# Patient Record
Sex: MALE | Race: WHITE | NOT HISPANIC OR LATINO | Employment: FULL TIME | URBAN - METROPOLITAN AREA
[De-identification: names, ages, dates, MRNs, and addresses within clinical notes are randomized per-mention and may not be internally consistent; named-entity substitution may affect disease eponyms.]

---

## 2023-02-18 ENCOUNTER — APPOINTMENT (OUTPATIENT)
Dept: GENERAL RADIOLOGY | Facility: HOSPITAL | Age: 58
End: 2023-02-18
Payer: MEDICAID

## 2023-02-18 ENCOUNTER — HOSPITAL ENCOUNTER (EMERGENCY)
Facility: HOSPITAL | Age: 58
Discharge: HOME | End: 2023-02-18
Attending: EMERGENCY MEDICINE | Admitting: EMERGENCY MEDICINE
Payer: MEDICAID

## 2023-02-18 VITALS
TEMPERATURE: 98.9 F | HEART RATE: 75 BPM | OXYGEN SATURATION: 99 % | RESPIRATION RATE: 16 BRPM | HEIGHT: 70 IN | BODY MASS INDEX: 21.47 KG/M2 | WEIGHT: 150 LBS | DIASTOLIC BLOOD PRESSURE: 100 MMHG | SYSTOLIC BLOOD PRESSURE: 169 MMHG

## 2023-02-18 DIAGNOSIS — L03.031 CELLULITIS OF TOE OF RIGHT FOOT: ICD-10-CM

## 2023-02-18 DIAGNOSIS — M79.89 FOOT SWELLING: Primary | ICD-10-CM

## 2023-02-18 DIAGNOSIS — R60.0 EDEMA OF BOTH LOWER EXTREMITIES: ICD-10-CM

## 2023-02-18 DIAGNOSIS — B35.1 FUNGAL INFECTION OF NAIL: ICD-10-CM

## 2023-02-18 LAB
ALBUMIN SERPL-MCNC: 4.5 G/DL (ref 3.5–5.2)
ALBUMIN/GLOB SERPL: 1.8 G/DL
ALP SERPL-CCNC: 95 U/L (ref 39–117)
ALT SERPL W P-5'-P-CCNC: 21 U/L (ref 1–41)
ANION GAP SERPL CALCULATED.3IONS-SCNC: 9.8 MMOL/L (ref 5–15)
AST SERPL-CCNC: 18 U/L (ref 1–40)
BASOPHILS # BLD AUTO: 0.1 10*3/MM3 (ref 0–0.2)
BASOPHILS NFR BLD AUTO: 0.8 % (ref 0–1.5)
BILIRUB SERPL-MCNC: 0.3 MG/DL (ref 0–1.2)
BUN SERPL-MCNC: 13 MG/DL (ref 6–20)
BUN/CREAT SERPL: 15.9 (ref 7–25)
CALCIUM SPEC-SCNC: 9.5 MG/DL (ref 8.6–10.5)
CHLORIDE SERPL-SCNC: 104 MMOL/L (ref 98–107)
CO2 SERPL-SCNC: 27.2 MMOL/L (ref 22–29)
CREAT SERPL-MCNC: 0.82 MG/DL (ref 0.76–1.27)
CRP SERPL-MCNC: <0.3 MG/DL (ref 0–0.5)
DEPRECATED RDW RBC AUTO: 47.5 FL (ref 37–54)
EGFRCR SERPLBLD CKD-EPI 2021: 101.8 ML/MIN/1.73
EOSINOPHIL # BLD AUTO: 0.53 10*3/MM3 (ref 0–0.4)
EOSINOPHIL NFR BLD AUTO: 4 % (ref 0.3–6.2)
ERYTHROCYTE [DISTWIDTH] IN BLOOD BY AUTOMATED COUNT: 13.8 % (ref 12.3–15.4)
ERYTHROCYTE [SEDIMENTATION RATE] IN BLOOD: 11 MM/HR (ref 0–20)
GLOBULIN UR ELPH-MCNC: 2.5 GM/DL
GLUCOSE SERPL-MCNC: 96 MG/DL (ref 65–99)
HCT VFR BLD AUTO: 41.2 % (ref 37.5–51)
HGB BLD-MCNC: 14 G/DL (ref 13–17.7)
HOLD SPECIMEN: NORMAL
HOLD SPECIMEN: NORMAL
IMM GRANULOCYTES # BLD AUTO: 0.06 10*3/MM3 (ref 0–0.05)
IMM GRANULOCYTES NFR BLD AUTO: 0.5 % (ref 0–0.5)
LYMPHOCYTES # BLD AUTO: 3.71 10*3/MM3 (ref 0.7–3.1)
LYMPHOCYTES NFR BLD AUTO: 27.8 % (ref 19.6–45.3)
MCH RBC QN AUTO: 32 PG (ref 26.6–33)
MCHC RBC AUTO-ENTMCNC: 34 G/DL (ref 31.5–35.7)
MCV RBC AUTO: 94.1 FL (ref 79–97)
MONOCYTES # BLD AUTO: 0.8 10*3/MM3 (ref 0.1–0.9)
MONOCYTES NFR BLD AUTO: 6 % (ref 5–12)
NEUTROPHILS NFR BLD AUTO: 60.9 % (ref 42.7–76)
NEUTROPHILS NFR BLD AUTO: 8.13 10*3/MM3 (ref 1.7–7)
NRBC BLD AUTO-RTO: 0 /100 WBC (ref 0–0.2)
NT-PROBNP SERPL-MCNC: 80.9 PG/ML (ref 0–900)
PLATELET # BLD AUTO: 348 10*3/MM3 (ref 140–450)
PMV BLD AUTO: 9.3 FL (ref 6–12)
POTASSIUM SERPL-SCNC: 4 MMOL/L (ref 3.5–5.2)
PROT SERPL-MCNC: 7 G/DL (ref 6–8.5)
RBC # BLD AUTO: 4.38 10*6/MM3 (ref 4.14–5.8)
SODIUM SERPL-SCNC: 141 MMOL/L (ref 136–145)
WBC NRBC COR # BLD: 13.33 10*3/MM3 (ref 3.4–10.8)
WHOLE BLOOD HOLD COAG: NORMAL
WHOLE BLOOD HOLD SPECIMEN: NORMAL

## 2023-02-18 PROCEDURE — 85652 RBC SED RATE AUTOMATED: CPT | Performed by: EMERGENCY MEDICINE

## 2023-02-18 PROCEDURE — 63710000001 ONDANSETRON ODT 4 MG TABLET DISPERSIBLE: Performed by: NURSE PRACTITIONER

## 2023-02-18 PROCEDURE — 83880 ASSAY OF NATRIURETIC PEPTIDE: CPT | Performed by: NURSE PRACTITIONER

## 2023-02-18 PROCEDURE — 80053 COMPREHEN METABOLIC PANEL: CPT | Performed by: EMERGENCY MEDICINE

## 2023-02-18 PROCEDURE — 86140 C-REACTIVE PROTEIN: CPT | Performed by: EMERGENCY MEDICINE

## 2023-02-18 PROCEDURE — 85025 COMPLETE CBC W/AUTO DIFF WBC: CPT | Performed by: EMERGENCY MEDICINE

## 2023-02-18 PROCEDURE — 99283 EMERGENCY DEPT VISIT LOW MDM: CPT

## 2023-02-18 PROCEDURE — 73630 X-RAY EXAM OF FOOT: CPT

## 2023-02-18 RX ORDER — HYDROCODONE BITARTRATE AND ACETAMINOPHEN 5; 325 MG/1; MG/1
1 TABLET ORAL ONCE
Status: COMPLETED | OUTPATIENT
Start: 2023-02-18 | End: 2023-02-18

## 2023-02-18 RX ORDER — ONDANSETRON 4 MG/1
4 TABLET, ORALLY DISINTEGRATING ORAL ONCE
Status: COMPLETED | OUTPATIENT
Start: 2023-02-18 | End: 2023-02-18

## 2023-02-18 RX ORDER — SODIUM CHLORIDE 0.9 % (FLUSH) 0.9 %
10 SYRINGE (ML) INJECTION AS NEEDED
Status: DISCONTINUED | OUTPATIENT
Start: 2023-02-18 | End: 2023-02-18 | Stop reason: HOSPADM

## 2023-02-18 RX ORDER — AMOXICILLIN AND CLAVULANATE POTASSIUM 875; 125 MG/1; MG/1
1 TABLET, FILM COATED ORAL EVERY 12 HOURS
Qty: 14 TABLET | Refills: 0 | Status: SHIPPED | OUTPATIENT
Start: 2023-02-18 | End: 2023-03-13

## 2023-02-18 RX ADMIN — ONDANSETRON 4 MG: 4 TABLET, ORALLY DISINTEGRATING ORAL at 08:44

## 2023-02-18 RX ADMIN — HYDROCODONE BITARTRATE AND ACETAMINOPHEN 1 TABLET: 5; 325 TABLET ORAL at 08:45

## 2023-02-18 NOTE — DISCHARGE INSTRUCTIONS
All of your lab work today was overall normal.  Your white blood cell count was mildly elevated.  The x-ray of your foot does not show any infection into the bone.  I am sending you home with oral antibiotics as well as fungal infection medication for you to apply topically.  Please make sure you take and complete the course of therapy of the antibiotics until they are gone.  Additionally, please call and schedule a follow-up appointment with podiatry.  Keep your legs elevated is much as possible to help with the swelling.  Please find a family doctor to follow-up with for regular health maintenance.  Please keep a monitor on your blood pressure as it was elevated today.  Please return to the emergency department with any new or worsening symptoms.

## 2023-02-18 NOTE — ED PROVIDER NOTES
Time: 7:12 AM EST  Date of encounter:  2/18/2023  Independent Historian/Clinical History and Information was obtained by:   Patient  Chief Complaint: Bilateral foot pain    History is limited by: N/A    History of Present Illness:  Patient is a 57 y.o. year old male who presents to the emergency department for evaluation of bilateral foot pain, swelling and right great toe injury.  Patient reports for the last couple of days he has noticed increased swelling to his feet bilaterally.  Patient reports in December, he noticed that after stubbing his right great toe and wearing his boots outside in really cold wet weather, he has developed this pain to his right great toe as well as scabbing and some redness.  He reports that he has been trying to take care of it is self at home.  He states he never goes to the doctor.  He has no PCP at this time.  Denies any significant past medical history.  He is a smoker.  He denies any chest pain or shortness of breath.  Denies any fever.  States that he has been occasionally using peroxide to help clean his toe, but has not used it for a month.  Pain is worse with walking.  He complains of a stinging pain.  Has good sensation.    HPI    Patient Care Team  Primary Care Provider: Provider, No Known    Past Medical History:     No Known Allergies  History reviewed. No pertinent past medical history.  History reviewed. No pertinent surgical history.  History reviewed. No pertinent family history.    Home Medications:  Prior to Admission medications    Not on File        Social History:   Social History     Tobacco Use   • Smoking status: Every Day     Packs/day: 1.00     Types: Cigarettes   Substance Use Topics   • Alcohol use: Never   • Drug use: Yes     Types: Marijuana         Review of Systems:  Review of Systems   Constitutional: Negative for activity change, chills, fatigue and fever.   HENT: Negative.    Eyes: Negative.    Respiratory: Negative for shortness of breath.   "  Cardiovascular: Negative for chest pain.   Gastrointestinal: Negative for nausea and vomiting.   Endocrine: Negative.    Genitourinary: Negative.    Musculoskeletal: Positive for arthralgias (Bilateral feet). Negative for joint swelling, neck pain and neck stiffness.   Skin: Positive for color change (Right great toe) and wound (Right great toe).   Allergic/Immunologic: Negative.    Neurological: Negative for dizziness and headaches.   Hematological: Negative.    Psychiatric/Behavioral: Negative.         Physical Exam:  /100   Pulse 75   Temp 98.9 °F (37.2 °C) (Oral)   Resp 16   Ht 177.8 cm (70\")   Wt 68 kg (150 lb)   SpO2 99%   BMI 21.52 kg/m²     Physical Exam  Vitals and nursing note reviewed.   Constitutional:       Appearance: Normal appearance. He is not ill-appearing or toxic-appearing.   HENT:      Head: Normocephalic and atraumatic.      Mouth/Throat:      Mouth: Mucous membranes are moist.      Pharynx: Oropharynx is clear.   Eyes:      Extraocular Movements: Extraocular movements intact.      Pupils: Pupils are equal, round, and reactive to light.   Cardiovascular:      Rate and Rhythm: Normal rate and regular rhythm.      Pulses: Normal pulses.      Heart sounds: Normal heart sounds.      Comments: Patient has 3-4+ pitting edema to his feet bilaterally.  Pulses are palpable and equal bilaterally.  Pulmonary:      Effort: Pulmonary effort is normal.      Breath sounds: Normal breath sounds.   Abdominal:      General: Bowel sounds are normal.      Palpations: Abdomen is soft.      Tenderness: There is no abdominal tenderness.   Musculoskeletal:         General: Swelling (Right great toe) and tenderness (Right great toe) present.      Cervical back: Normal range of motion and neck supple.      Comments: Patient has 3-4+ pitting edema to his feet bilaterally.  Extends to just past his ankles bilaterally.   Skin:     General: Skin is warm and dry.      Capillary Refill: Capillary refill takes " less than 2 seconds.      Coloration: Skin is not pale.      Findings: Erythema present. No rash.      Comments: The right great toe surrounding the entire nail and the end of the toe is covered in a scabbing, brownish coating.  There is no drainage.    There is skin peeling and dryness noted to his feet bilaterally, more prominent to the soles of his feet.    There is some mild erythema noted to his feet bilaterally.  More notably to his right great toe and surrounding skin.   Neurological:      General: No focal deficit present.      Mental Status: He is alert and oriented to person, place, and time.   Psychiatric:         Mood and Affect: Mood normal.         Behavior: Behavior normal.                  Procedures:  Procedures      Medical Decision Making:      Comorbidities that affect care:    Smoking    External Notes reviewed:    None      The following orders were placed and all results were independently analyzed by me:  Orders Placed This Encounter   Procedures   • XR Foot 3+ View Right   • Woodruff Draw   • Comprehensive Metabolic Panel   • C-reactive Protein   • Sedimentation Rate   • CBC Auto Differential   • BNP   • Ambulatory Referral to Podiatry   • Insert peripheral IV   • Green Top (Gel)   • Lavender Top   • Gold Top - SST   • Light Blue Top   • CBC & Differential       Medications Given in the Emergency Department:  Medications   sodium chloride 0.9 % flush 10 mL (has no administration in time range)   HYDROcodone-acetaminophen (NORCO) 5-325 MG per tablet 1 tablet (1 tablet Oral Given 2/18/23 0845)   ondansetron ODT (ZOFRAN-ODT) disintegrating tablet 4 mg (4 mg Oral Given 2/18/23 0844)        ED Course:    ED Course as of 02/18/23 0905   Sat Feb 18, 2023   0805 CBC reveals mild leukocytosis.  White blood cell count is 13.33.  Otherwise normal.  CMP is unremarkable.  CRP is normal.  Sed rate is normal. [AR]   0806 X-ray of the right foot reveals the following  1. Diffuse soft tissue swelling may  represent dependent edema, posttraumatic changes or cellulitis.  2. Diffuse osteopenia  3. No radiographic evidence of osteomyelitis. [AR]   0838 BNP normal [AR]      ED Course User Index  [AR] Eda De La O, NADJA       Labs:    Lab Results (last 24 hours)     Procedure Component Value Units Date/Time    CBC & Differential [[id number]]  (Abnormal) Collected: 02/18/23 0637    Specimen: Blood Updated: 02/18/23 0702    Narrative:      The following orders were created for panel order CBC & Differential.  Procedure                               Abnormality         Status                     ---------                               -----------         ------                     CBC Auto Differential[[id number]]        Abnormal            Final result                 Please view results for these tests on the individual orders.    Comprehensive Metabolic Panel [[id number]] Collected: 02/18/23 0637    Specimen: Blood Updated: 02/18/23 0709     Glucose 96 mg/dL      BUN 13 mg/dL      Creatinine 0.82 mg/dL      Sodium 141 mmol/L      Potassium 4.0 mmol/L      Chloride 104 mmol/L      CO2 27.2 mmol/L      Calcium 9.5 mg/dL      Total Protein 7.0 g/dL      Albumin 4.5 g/dL      ALT (SGPT) 21 U/L      AST (SGOT) 18 U/L      Alkaline Phosphatase 95 U/L      Total Bilirubin 0.3 mg/dL      Globulin 2.5 gm/dL      A/G Ratio 1.8 g/dL      BUN/Creatinine Ratio 15.9     Anion Gap 9.8 mmol/L      eGFR 101.8 mL/min/1.73     Narrative:      GFR Normal >60  Chronic Kidney Disease <60  Kidney Failure <15      C-reactive Protein [[id number]]  (Normal) Collected: 02/18/23 0637    Specimen: Blood Updated: 02/18/23 0709     C-Reactive Protein <0.30 mg/dL     Sedimentation Rate [[id number]]  (Normal) Collected: 02/18/23 0637    Specimen: Blood Updated: 02/18/23 0655     Sed Rate 11 mm/hr     CBC Auto Differential [[id number]]  (Abnormal) Collected: 02/18/23 0637    Specimen: Blood Updated: 02/18/23 0702     WBC 13.33 10*3/mm3      RBC 4.38  10*6/mm3      Hemoglobin 14.0 g/dL      Hematocrit 41.2 %      MCV 94.1 fL      MCH 32.0 pg      MCHC 34.0 g/dL      RDW 13.8 %      RDW-SD 47.5 fl      MPV 9.3 fL      Platelets 348 10*3/mm3      Neutrophil % 60.9 %      Lymphocyte % 27.8 %      Monocyte % 6.0 %      Eosinophil % 4.0 %      Basophil % 0.8 %      Immature Grans % 0.5 %      Neutrophils, Absolute 8.13 10*3/mm3      Lymphocytes, Absolute 3.71 10*3/mm3      Monocytes, Absolute 0.80 10*3/mm3      Eosinophils, Absolute 0.53 10*3/mm3      Basophils, Absolute 0.10 10*3/mm3      Immature Grans, Absolute 0.06 10*3/mm3      nRBC 0.0 /100 WBC     BNP [[id number]]  (Normal) Collected: 02/18/23 0637    Specimen: Blood Updated: 02/18/23 0817     proBNP 80.9 pg/mL     Narrative:      Among patients with dyspnea, NT-proBNP is highly sensitive for the detection of acute congestive heart failure. In addition NT-proBNP of <300 pg/ml effectively rules out acute congestive heart failure with 99% negative predictive value.    Results may be falsely decreased if patient taking Biotin.             Imaging:    XR Foot 3+ View Right    Result Date: 2/18/2023  PROCEDURE: XR FOOT 3+ VW RIGHT  COMPARISON: None  INDICATIONS: non healing wound/swelling rt foot  FINDINGS:  Moderate soft tissue swelling is noted throughout the foot.  No soft tissue gas or radiopaque foreign body is identified.  The bones appear diffusely osteopenic.  No osseous destruction is identified to suggest osteomyelitis.  No tibiotalar or subtalar joint effusion is noted.        1. Diffuse soft tissue swelling may represent dependent edema, posttraumatic changes or cellulitis. 2. Diffuse osteopenia 3. No radiographic evidence of osteomyelitis.      Priyank Lopez M.D.       Electronically Signed and Approved By: Priyank Lopez M.D. on 2/18/2023 at 7:05                 Differential Diagnosis and Discussion:    Extremity Pain: Differential diagnosis includes but is not limited to soft tissue sprain, tendonitis,  tendon injury, dislocation, fracture, deep vein thrombosis, arterial insufficiency, osteoarthritis, bursitis, and ligamentous damage.    All labs were reviewed and interpreted by me.  All X-rays were independently reviewed by me.    MDM  Number of Diagnoses or Management Options  Cellulitis of toe of right foot  Edema of both lower extremities  Foot swelling  Fungal infection of nail  Diagnosis management comments: I have explained the patient´s condition, diagnoses and treatment plan based on the information available to me at this time. I have answered questions and addressed any concerns. The patient has a good  understanding of the patient´s diagnosis, condition, and treatment plan as can be expected at this point. The vital signs have been stable. The patient´s condition is stable and appropriate for discharge from the emergency department.      The patient will pursue further outpatient evaluation with the primary care physician or other designated or consulting physician as outlined in the discharge instructions. They are agreeable to this plan of care and follow-up instructions have been explained in detail. The patient has received these instructions in written format and have expressed an understanding of the discharge instructions. The patient is aware that any significant change in condition or worsening of symptoms should prompt an immediate return to this or the closest emergency department or call to 911.       Amount and/or Complexity of Data Reviewed  Clinical lab tests: reviewed and ordered  Tests in the radiology section of CPT®: reviewed and ordered  Tests in the medicine section of CPT®: reviewed and ordered  Review and summarize past medical records: yes  Independent visualization of images, tracings, or specimens: yes    Risk of Complications, Morbidity, and/or Mortality  Presenting problems: moderate  Diagnostic procedures: moderate  Management options: moderate    Patient Progress  Patient  progress: stable            Patient Care Considerations:    CT EXTREMITY: I considered ordering an extremity CT, however X-ray is negative for any evidence of osteomyelitis or presence of gas.  Patient is neurovascularly intact.      Consultants/Shared Management Plan:    None    Social Determinants of Health:    Patient is independent, reliable, and has access to care.       Disposition and Care Coordination:    Discharged: The patient is suitable and stable for discharge with no need for consideration of observation or admission.    I have explained discharge medications and the need for follow up with the patient/caretakers. This was also printed in the discharge instructions. Patient was discharged with the following medications and follow up:      Medication List      New Prescriptions    amoxicillin-clavulanate 875-125 MG per tablet  Commonly known as: AUGMENTIN  Take 1 tablet by mouth Every 12 (Twelve) Hours.     terbinafine 1 % cream  Commonly known as: lamISIL  Apply 1 application topically to the appropriate area as directed Every Night.           Where to Get Your Medications      These medications were sent to [facility] [address] [place] [address] [phone] [place] [id number] [address] [address]    Phone: [phone]   · amoxicillin-clavulanate 875-125 MG per tablet  · terbinafine 1 % cream      Austin Tamez, FOUZIA  [address] [facility]  [address]  [facility] [address]  [phone]    Schedule an appointment as soon as possible for a visit       [facility] EMERGENCY ROOM  [address] [place] [id number] [phone]  Go to   If symptoms worsen, As needed    Provider, No Known  [facility] [place] [address]      Please find a family doctor to follow-up with       Final diagnoses:   Foot swelling   Edema of both lower extremities   Cellulitis of toe of right foot   Fungal infection of nail        ED  Disposition     ED Disposition   Discharge    Condition   Stable    Comment   --             This medical record created using voice recognition software.           Eda De La O, APRN  02/18/23 0911

## 2023-02-24 ENCOUNTER — OFFICE VISIT (OUTPATIENT)
Dept: PODIATRY | Facility: CLINIC | Age: 58
End: 2023-02-24
Payer: MEDICAID

## 2023-02-24 VITALS
HEART RATE: 95 BPM | HEIGHT: 70 IN | TEMPERATURE: 98.4 F | WEIGHT: 150 LBS | BODY MASS INDEX: 21.47 KG/M2 | OXYGEN SATURATION: 97 % | SYSTOLIC BLOOD PRESSURE: 178 MMHG | DIASTOLIC BLOOD PRESSURE: 99 MMHG

## 2023-02-24 DIAGNOSIS — L02.611 ABSCESS, TOE, RIGHT: Primary | ICD-10-CM

## 2023-02-24 DIAGNOSIS — I73.9 PERIPHERAL VASCULAR DISEASE: ICD-10-CM

## 2023-02-24 PROCEDURE — 87070 CULTURE OTHR SPECIMN AEROBIC: CPT | Performed by: PODIATRIST

## 2023-02-24 PROCEDURE — 87205 SMEAR GRAM STAIN: CPT | Performed by: PODIATRIST

## 2023-02-24 PROCEDURE — 10061 I&D ABSCESS COMP/MULTIPLE: CPT | Performed by: PODIATRIST

## 2023-02-24 PROCEDURE — 99203 OFFICE O/P NEW LOW 30 MIN: CPT | Performed by: PODIATRIST

## 2023-02-24 RX ORDER — SULFAMETHOXAZOLE AND TRIMETHOPRIM 800; 160 MG/1; MG/1
1 TABLET ORAL 2 TIMES DAILY
Qty: 20 TABLET | Refills: 0 | Status: SHIPPED | OUTPATIENT
Start: 2023-02-24 | End: 2023-03-10

## 2023-02-24 NOTE — PROGRESS NOTES
[facility] - PODIATRY    Today's Date: 02/24/23    Patient Name: Marco Antonio Davison  MRN: [id number]  CSN: [id number]  PCP: Provider, No Known,   Referring Provider: Eda De La O APRN    SUBJECTIVE     Chief Complaint   Patient presents with   • Left Foot - Establish Care, Cellulitis, Pain   • Right Foot - Establish Care, Cellulitis, Pain     HPI: Marco Antonio Davison, a 57 y.o.male, presents to clinic.    Patient is a 58-year-old male presenting with right big toe infection.  Patient states that he hit his toe on something several months ago.  It is slowly gotten swollen along with more tenderness.  Patient states it has been draining in the past.  He states he smokes over 1 pack a day.  He does not have a primary care doctor and has not seen a physician recently.  He has pain in his foot.    Past Medical History:   Diagnosis Date   • Callus N/a   • Difficulty walking N/a     History reviewed. No pertinent surgical history.  Family History   Problem Relation Age of Onset   • Cancer Neg Hx    • Diabetes Neg Hx    • Heart disease Neg Hx    • Hypertension Neg Hx    • Thyroid disease Neg Hx      Social History     Socioeconomic History   • Marital status:    Tobacco Use   • Smoking status: Every Day     Packs/day: 1.00     Years: 40.00     Pack years: 40.00     Types: Cigarettes   Vaping Use   • Vaping Use: Never used   Substance and Sexual Activity   • Alcohol use: Never   • Drug use: Yes     Frequency: 2.0 times per week     Types: Marijuana   • Sexual activity: Not Currently     No Known Allergies  Current Outpatient Medications   Medication Sig Dispense Refill   • amoxicillin-clavulanate (AUGMENTIN) 875-125 MG per tablet Take 1 tablet by mouth Every 12 (Twelve) Hours. 14 tablet 0   • terbinafine (lamISIL) 1 % cream Apply 1 application topically to the appropriate area as directed Every Night. 15 g 0   • sulfamethoxazole-trimethoprim (Bactrim DS) 800-160 MG per tablet Take 1 tablet by mouth 2 (Two)  Times a Day. 20 tablet 0     No current facility-administered medications for this visit.     Review of Systems   Skin:        Infection right great toe       OBJECTIVE     Vitals:    02/24/23 0756   BP: 178/99   Pulse: 95   Temp: 98.4 °F (36.9 °C)   SpO2: 97%       WBC   Date Value Ref Range Status   02/18/2023 13.33 (H) 3.40 - 10.80 10*3/mm3 Final     RBC   Date Value Ref Range Status   02/18/2023 4.38 4.14 - 5.80 10*6/mm3 Final     Hemoglobin   Date Value Ref Range Status   02/18/2023 14.0 13.0 - 17.7 g/dL Final     Hematocrit   Date Value Ref Range Status   02/18/2023 41.2 37.5 - 51.0 % Final     MCV   Date Value Ref Range Status   02/18/2023 94.1 79.0 - 97.0 fL Final     MCH   Date Value Ref Range Status   02/18/2023 32.0 26.6 - 33.0 pg Final     MCHC   Date Value Ref Range Status   02/18/2023 34.0 31.5 - 35.7 g/dL Final     RDW   Date Value Ref Range Status   02/18/2023 13.8 12.3 - 15.4 % Final     RDW-SD   Date Value Ref Range Status   02/18/2023 47.5 37.0 - 54.0 fl Final     MPV   Date Value Ref Range Status   02/18/2023 9.3 6.0 - 12.0 fL Final     Platelets   Date Value Ref Range Status   02/18/2023 348 140 - 450 10*3/mm3 Final     Neutrophil %   Date Value Ref Range Status   02/18/2023 60.9 42.7 - 76.0 % Final     Lymphocyte %   Date Value Ref Range Status   02/18/2023 27.8 19.6 - 45.3 % Final     Monocyte %   Date Value Ref Range Status   02/18/2023 6.0 5.0 - 12.0 % Final     Eosinophil %   Date Value Ref Range Status   02/18/2023 4.0 0.3 - 6.2 % Final     Basophil %   Date Value Ref Range Status   02/18/2023 0.8 0.0 - 1.5 % Final     Immature Grans %   Date Value Ref Range Status   02/18/2023 0.5 0.0 - 0.5 % Final     Neutrophils, Absolute   Date Value Ref Range Status   02/18/2023 8.13 (H) 1.70 - 7.00 10*3/mm3 Final     Lymphocytes, Absolute   Date Value Ref Range Status   02/18/2023 3.71 (H) 0.70 - 3.10 10*3/mm3 Final     Monocytes, Absolute   Date Value Ref Range Status   02/18/2023 0.80 0.10 - 0.90  10*3/mm3 Final     Eosinophils, Absolute   Date Value Ref Range Status   02/18/2023 0.53 (H) 0.00 - 0.40 10*3/mm3 Final     Basophils, Absolute   Date Value Ref Range Status   02/18/2023 0.10 0.00 - 0.20 10*3/mm3 Final     Immature Grans, Absolute   Date Value Ref Range Status   02/18/2023 0.06 (H) 0.00 - 0.05 10*3/mm3 Final     nRBC   Date Value Ref Range Status   02/18/2023 0.0 0.0 - 0.2 /100 WBC Final         Lab Results   Component Value Date    GLUCOSE 96 02/18/2023    BUN 13 02/18/2023    CREATININE 0.82 02/18/2023    BCR 15.9 02/18/2023    K 4.0 02/18/2023    CO2 27.2 02/18/2023    CALCIUM 9.5 02/18/2023    ALBUMIN 4.5 02/18/2023    AST 18 02/18/2023    ALT 21 02/18/2023       Patient seen in no apparent distress.      PHYSICAL EXAM:     Foot/Ankle Exam:       General:   Appearance: appears stated age and healthy    Orientation: AAOx3    Affect: appropriate    Gait: unimpaired    Shoe Gear:  Casual shoes    VASCULAR      Right Foot Vascularity   Normal vascular exam    Dorsalis pedis:  1+  Posterior tibial:  1+  Skin Temperature: warm    Edema Grading:  None, pitting and 3+  CFT:  < 3 seconds  Pedal Hair Growth:  Present  Varicosities: none       Left Foot Vascularity   Normal vascular exam    Dorsalis pedis:  1+  Posterior tibial:  1+  Skin Temperature: warm    Edema Grading:  None, 3+ and pitting  CFT:  < 3 seconds  Pedal Hair Growth:  Present  Varicosities: none        NEUROLOGIC     Right Foot Neurologic   Normal sensation    Light touch sensation:  Normal  Vibratory sensation:  Normal  Hot/Cold sensation: normal    Protective Sensation using [place] Monofilament:  10     Left Foot Neurologic   Normal sensation    Light touch sensation:  Normal  Vibratory sensation:  Normal  Hot/cold sensation: normal    Protective Sensation using [place] Monofilament:  10     MUSCLE STRENGTH     Right Foot Muscle Strength   Foot dorsiflexion:  4  Foot plantar flexion:  4  Foot inversion:  4  Foot  eversion:  4     Left Foot Muscle Strength   Foot dorsiflexion:  4  Foot plantar flexion:  4  Foot inversion:  4  Foot eversion:  4     RANGE OF MOTION      Right Foot Range of Motion   Foot and ankle ROM within normal limits       Left Foot Range of Motion   Foot and ankle ROM within normal limits       DERMATOLOGIC     Right Foot Dermatologic   Skin: skin intact    Skin comment:  Swelling and erythema to right great toe with purulent drainage from right great toe distal tip.  Wound to distal tip of toe 50% necrotic.  Nails: normal       Left Foot Dermatologic   Skin: skin intact    Nails: normal        XR Foot 3+ View Right    Result Date: 2/18/2023  Narrative: PROCEDURE: XR FOOT 3+ VW RIGHT  COMPARISON: None  INDICATIONS: non healing wound/swelling rt foot  FINDINGS:  Moderate soft tissue swelling is noted throughout the foot.  No soft tissue gas or radiopaque foreign body is identified.  The bones appear diffusely osteopenic.  No osseous destruction is identified to suggest osteomyelitis.  No tibiotalar or subtalar joint effusion is noted.      Impression:   1. Diffuse soft tissue swelling may represent dependent edema, posttraumatic changes or cellulitis. 2. Diffuse osteopenia 3. No radiographic evidence of osteomyelitis.      Priyank Lopez M.D.       Electronically Signed and Approved By: Priyank Lopez M.D. on 2/18/2023 at 7:05               ASSESSMENT/PLAN     Diagnoses and all orders for this visit:    1. Abscess, toe, right (Primary)  -     Doppler Arterial Multi Level Lower Extremity - Bilateral CAR; Future    2. Peripheral vascular disease (HCC)    Other orders  -     sulfamethoxazole-trimethoprim (Bactrim DS) 800-160 MG per tablet; Take 1 tablet by mouth 2 (Two) Times a Day.  Dispense: 20 tablet; Refill: 0      Right foot anesthetized using 0.5% Marcaine plain.  Site was prepped with Betadine.  Using a tissue nipper and hemostat the area was incised and drained of purulent drainage approximately 1 to 2  cc.  Cultures were taken.  Noted to have a necrotic wound to the distal tip of the great toe.  Site was flushed and dressed with Betadine Adaptic 4 x 4's.    We will order arterial studies to rule out any vascular issues.  Cultures taken  Antibiotics prescribed    Comprehensive lower extremity examination and evaluation was performed.    Discussed findings and treatment plan including risks, benefits, and treatment options with patient in detail. Patient agreed with treatment plan.    Medications and allergies reviewed.  Reviewed available lab values along with other pertinent labs.  These were discussed with the patient.    An After Visit Summary was printed and given to the patient at discharge, including (if requested) any available informative/educational handouts regarding diagnosis, treatment, or medications. All questions were answered to patient/family satisfaction. Should symptoms fail to improve or worsen they agree to call or return to clinic or to go to the Emergency Department. Discussed the importance of following up with any needed screening tests/labs/specialist appointments and any requested follow-up recommended by me today. Importance of maintaining follow-up discussed and patient accepts that missed appointments can delay diagnosis and potentially lead to worsening of conditions.    Return in about 2 weeks (around 3/10/2023)., or sooner if acute issues arise.    This document has been electronically signed by Sebastián Hernandez DPM on February 24, 2023 08:48 EST

## 2023-02-27 LAB
BACTERIA SPEC AEROBE CULT: NORMAL
GRAM STN SPEC: NORMAL
GRAM STN SPEC: NORMAL

## 2023-03-10 ENCOUNTER — OFFICE VISIT (OUTPATIENT)
Dept: PODIATRY | Facility: CLINIC | Age: 58
End: 2023-03-10
Payer: MEDICAID

## 2023-03-10 VITALS
WEIGHT: 150 LBS | BODY MASS INDEX: 21.47 KG/M2 | TEMPERATURE: 96.2 F | HEART RATE: 101 BPM | HEIGHT: 70 IN | SYSTOLIC BLOOD PRESSURE: 173 MMHG | OXYGEN SATURATION: 97 % | DIASTOLIC BLOOD PRESSURE: 90 MMHG

## 2023-03-10 DIAGNOSIS — L60.0 INGROWN TOENAIL: Primary | ICD-10-CM

## 2023-03-10 DIAGNOSIS — I96 GANGRENE: ICD-10-CM

## 2023-03-10 DIAGNOSIS — I73.9 PVD (PERIPHERAL VASCULAR DISEASE): ICD-10-CM

## 2023-03-10 PROCEDURE — 99213 OFFICE O/P EST LOW 20 MIN: CPT | Performed by: PODIATRIST

## 2023-03-10 PROCEDURE — 1159F MED LIST DOCD IN RCRD: CPT | Performed by: PODIATRIST

## 2023-03-10 PROCEDURE — 1160F RVW MEDS BY RX/DR IN RCRD: CPT | Performed by: PODIATRIST

## 2023-03-10 NOTE — PROGRESS NOTES
[facility] - PODIATRY    Today's Date: 03/10/23    Patient Name: Marco Antonio Davison  MRN: [id number]  CSN: [id number]  PCP: Provider, No Known,   Referring Provider: No ref. provider found    SUBJECTIVE     Chief Complaint   Patient presents with   • Right Foot - Follow-up, Pain, Avulsion     Patient has not followed after care instructions with soaking his foot twice a day in epsom salt soaks.      HPI: Marco Antonio Davison, a 58 y.o.male, presents to clinic.    Patient is a 58-year-old male presenting with right big toe infection.  Patient states that he hit his toe on something several months ago.  It is slowly gotten swollen along with more tenderness.  Patient states it has been draining in the past.  He states he smokes over 1 pack a day.  He does not have a primary care doctor and has not seen a physician recently.  He has pain in his foot.    3/10/2023-patient is here for follow-up.  Patient states he still has pain when elevating his legs.  Needs to put them in a dependent position to relieve pain.  Still smoking.    Past Medical History:   Diagnosis Date   • Callus N/a   • Difficulty walking N/a     History reviewed. No pertinent surgical history.  Family History   Problem Relation Age of Onset   • Heart disease Mother    • Heart disease Brother    • Cancer Neg Hx    • Diabetes Neg Hx    • Hypertension Neg Hx    • Thyroid disease Neg Hx      Social History     Socioeconomic History   • Marital status:    Tobacco Use   • Smoking status: Every Day     Packs/day: 1.00     Years: 40.00     Pack years: 40.00     Types: Cigarettes   Vaping Use   • Vaping Use: Never used   Substance and Sexual Activity   • Alcohol use: Never   • Drug use: Yes     Frequency: 2.0 times per week     Types: Marijuana   • Sexual activity: Not Currently     No Known Allergies  Current Outpatient Medications   Medication Sig Dispense Refill   • terbinafine (lamISIL) 1 % cream Apply 1 application topically to the appropriate area  as directed Every Night. 15 g 0   • amoxicillin-clavulanate (AUGMENTIN) 875-125 MG per tablet Take 1 tablet by mouth Every 12 (Twelve) Hours. 14 tablet 0     No current facility-administered medications for this visit.     Review of Systems   Skin:        Infection right great toe       OBJECTIVE     Vitals:    03/10/23 0724   BP: 173/90   Pulse: 101   Temp: 96.2 °F (35.7 °C)   SpO2: 97%       WBC   Date Value Ref Range Status   02/18/2023 13.33 (H) 3.40 - 10.80 10*3/mm3 Final     RBC   Date Value Ref Range Status   02/18/2023 4.38 4.14 - 5.80 10*6/mm3 Final     Hemoglobin   Date Value Ref Range Status   02/18/2023 14.0 13.0 - 17.7 g/dL Final     Hematocrit   Date Value Ref Range Status   02/18/2023 41.2 37.5 - 51.0 % Final     MCV   Date Value Ref Range Status   02/18/2023 94.1 79.0 - 97.0 fL Final     MCH   Date Value Ref Range Status   02/18/2023 32.0 26.6 - 33.0 pg Final     MCHC   Date Value Ref Range Status   02/18/2023 34.0 31.5 - 35.7 g/dL Final     RDW   Date Value Ref Range Status   02/18/2023 13.8 12.3 - 15.4 % Final     RDW-SD   Date Value Ref Range Status   02/18/2023 47.5 37.0 - 54.0 fl Final     MPV   Date Value Ref Range Status   02/18/2023 9.3 6.0 - 12.0 fL Final     Platelets   Date Value Ref Range Status   02/18/2023 348 140 - 450 10*3/mm3 Final     Neutrophil %   Date Value Ref Range Status   02/18/2023 60.9 42.7 - 76.0 % Final     Lymphocyte %   Date Value Ref Range Status   02/18/2023 27.8 19.6 - 45.3 % Final     Monocyte %   Date Value Ref Range Status   02/18/2023 6.0 5.0 - 12.0 % Final     Eosinophil %   Date Value Ref Range Status   02/18/2023 4.0 0.3 - 6.2 % Final     Basophil %   Date Value Ref Range Status   02/18/2023 0.8 0.0 - 1.5 % Final     Immature Grans %   Date Value Ref Range Status   02/18/2023 0.5 0.0 - 0.5 % Final     Neutrophils, Absolute   Date Value Ref Range Status   02/18/2023 8.13 (H) 1.70 - 7.00 10*3/mm3 Final     Lymphocytes, Absolute   Date Value Ref Range Status    02/18/2023 3.71 (H) 0.70 - 3.10 10*3/mm3 Final     Monocytes, Absolute   Date Value Ref Range Status   02/18/2023 0.80 0.10 - 0.90 10*3/mm3 Final     Eosinophils, Absolute   Date Value Ref Range Status   02/18/2023 0.53 (H) 0.00 - 0.40 10*3/mm3 Final     Basophils, Absolute   Date Value Ref Range Status   02/18/2023 0.10 0.00 - 0.20 10*3/mm3 Final     Immature Grans, Absolute   Date Value Ref Range Status   02/18/2023 0.06 (H) 0.00 - 0.05 10*3/mm3 Final     nRBC   Date Value Ref Range Status   02/18/2023 0.0 0.0 - 0.2 /100 WBC Final         Lab Results   Component Value Date    GLUCOSE 96 02/18/2023    BUN 13 02/18/2023    CREATININE 0.82 02/18/2023    BCR 15.9 02/18/2023    K 4.0 02/18/2023    CO2 27.2 02/18/2023    CALCIUM 9.5 02/18/2023    ALBUMIN 4.5 02/18/2023    AST 18 02/18/2023    ALT 21 02/18/2023       Patient seen in no apparent distress.      PHYSICAL EXAM:     Foot/Ankle Exam:       General:   Appearance: appears stated age and healthy    Orientation: AAOx3    Affect: appropriate    Gait: unimpaired    Shoe Gear:  Casual shoes    VASCULAR      Right Foot Vascularity   Normal vascular exam    Dorsalis pedis:  1+  Posterior tibial:  1+  Skin Temperature: warm    Edema Grading:  None, pitting and 3+  CFT:  < 3 seconds  Pedal Hair Growth:  Present  Varicosities: none       Left Foot Vascularity   Normal vascular exam    Dorsalis pedis:  1+  Posterior tibial:  1+  Skin Temperature: warm    Edema Grading:  None, 3+ and pitting  CFT:  < 3 seconds  Pedal Hair Growth:  Present  Varicosities: none        NEUROLOGIC     Right Foot Neurologic   Normal sensation    Light touch sensation:  Normal  Vibratory sensation:  Normal  Hot/Cold sensation: normal    Protective Sensation using [place] Monofilament:  10     Left Foot Neurologic   Normal sensation    Light touch sensation:  Normal  Vibratory sensation:  Normal  Hot/cold sensation: normal    Protective Sensation using [place] Monofilament:   10     MUSCLE STRENGTH     Right Foot Muscle Strength   Foot dorsiflexion:  4  Foot plantar flexion:  4  Foot inversion:  4  Foot eversion:  4     Left Foot Muscle Strength   Foot dorsiflexion:  4  Foot plantar flexion:  4  Foot inversion:  4  Foot eversion:  4     RANGE OF MOTION      Right Foot Range of Motion   Foot and ankle ROM within normal limits       Left Foot Range of Motion   Foot and ankle ROM within normal limits       DERMATOLOGIC     Right Foot Dermatologic   Skin: skin intact    Skin comment:  Erythema resolved.  No purulent drainage.  Dry necrotic eschar to distal aspect of right great toe.  Nails: normal       Left Foot Dermatologic   Skin: skin intact    Nails: normal        XR Foot 3+ View Right    Result Date: 2/18/2023  Narrative: PROCEDURE: XR FOOT 3+ VW RIGHT  COMPARISON: None  INDICATIONS: non healing wound/swelling rt foot  FINDINGS:  Moderate soft tissue swelling is noted throughout the foot.  No soft tissue gas or radiopaque foreign body is identified.  The bones appear diffusely osteopenic.  No osseous destruction is identified to suggest osteomyelitis.  No tibiotalar or subtalar joint effusion is noted.      Impression:   1. Diffuse soft tissue swelling may represent dependent edema, posttraumatic changes or cellulitis. 2. Diffuse osteopenia 3. No radiographic evidence of osteomyelitis.      Priyank Lopez M.D.       Electronically Signed and Approved By: Priyank Lopez M.D. on 2/18/2023 at 7:05               ASSESSMENT/PLAN     Diagnoses and all orders for this visit:    1. Ingrown toenail (Primary)    2. Gangrene (HCC)    3. PVD (peripheral vascular disease) (HCC)      Arterial studies scheduled in 2 weeks  Betadine daily to right great toe  Patient to call if symptoms worsen, or go to emergency department    Comprehensive lower extremity examination and evaluation was performed.    Discussed findings and treatment plan including risks, benefits, and treatment options with patient in  detail. Patient agreed with treatment plan.    Medications and allergies reviewed.  Reviewed available lab values along with other pertinent labs.  These were discussed with the patient.    An After Visit Summary was printed and given to the patient at discharge, including (if requested) any available informative/educational handouts regarding diagnosis, treatment, or medications. All questions were answered to patient/family satisfaction. Should symptoms fail to improve or worsen they agree to call or return to clinic or to go to the Emergency Department. Discussed the importance of following up with any needed screening tests/labs/specialist appointments and any requested follow-up recommended by me today. Importance of maintaining follow-up discussed and patient accepts that missed appointments can delay diagnosis and potentially lead to worsening of conditions.    Return in 2 weeks (on 3/24/2023)., or sooner if acute issues arise.    This document has been electronically signed by Sebastián Hernandez DPM on March 10, 2023 08:04 EST

## 2023-03-13 ENCOUNTER — APPOINTMENT (OUTPATIENT)
Dept: CT IMAGING | Facility: HOSPITAL | Age: 58
DRG: 301 | End: 2023-03-13
Payer: MEDICAID

## 2023-03-13 ENCOUNTER — APPOINTMENT (OUTPATIENT)
Dept: CARDIOLOGY | Facility: HOSPITAL | Age: 58
DRG: 301 | End: 2023-03-13
Payer: MEDICAID

## 2023-03-13 ENCOUNTER — HOSPITAL ENCOUNTER (INPATIENT)
Facility: HOSPITAL | Age: 58
LOS: 1 days | Discharge: ANOTHER HOSPITAL | DRG: 301 | End: 2023-03-14
Attending: EMERGENCY MEDICINE | Admitting: INTERNAL MEDICINE
Payer: MEDICAID

## 2023-03-13 DIAGNOSIS — Z78.9 DECREASED ACTIVITIES OF DAILY LIVING (ADL): ICD-10-CM

## 2023-03-13 DIAGNOSIS — R26.2 DIFFICULTY WALKING: ICD-10-CM

## 2023-03-13 DIAGNOSIS — I70.90 ARTERIAL OCCLUSION: Primary | ICD-10-CM

## 2023-03-13 LAB
ALBUMIN SERPL-MCNC: 4.5 G/DL (ref 3.5–5.2)
ALBUMIN/GLOB SERPL: 1.7 G/DL
ALP SERPL-CCNC: 108 U/L (ref 39–117)
ALT SERPL W P-5'-P-CCNC: 20 U/L (ref 1–41)
ANION GAP SERPL CALCULATED.3IONS-SCNC: 11.1 MMOL/L (ref 5–15)
APTT PPP: 133.6 SECONDS (ref 78–95.9)
AST SERPL-CCNC: 17 U/L (ref 1–40)
BASOPHILS # BLD AUTO: 0.07 10*3/MM3 (ref 0–0.2)
BASOPHILS NFR BLD AUTO: 0.5 % (ref 0–1.5)
BH CV LOWER ARTERIAL LEFT ABI RATIO: 0.43
BH CV LOWER ARTERIAL LEFT DORSALIS PEDIS SYS MAX: 86
BH CV LOWER ARTERIAL LEFT GREAT TOE SYS MAX: 25
BH CV LOWER ARTERIAL LEFT POST TIBIAL SYS MAX: 75
BH CV LOWER ARTERIAL LEFT TBI RATIO: 0.13
BH CV LOWER ARTERIAL RIGHT ABI RATIO: 0.2
BH CV LOWER ARTERIAL RIGHT DORSALIS PEDIS SYS MAX: 29
BH CV LOWER ARTERIAL RIGHT POST TIBIAL SYS MAX: 40
BH CV LOWER VASCULAR LEFT COMMON FEMORAL AUGMENT: NORMAL
BH CV LOWER VASCULAR LEFT COMMON FEMORAL COMPETENT: NORMAL
BH CV LOWER VASCULAR LEFT COMMON FEMORAL COMPRESS: NORMAL
BH CV LOWER VASCULAR LEFT COMMON FEMORAL PHASIC: NORMAL
BH CV LOWER VASCULAR LEFT COMMON FEMORAL SPONT: NORMAL
BH CV LOWER VASCULAR LEFT DISTAL FEMORAL COMPRESS: NORMAL
BH CV LOWER VASCULAR LEFT MID FEMORAL AUGMENT: NORMAL
BH CV LOWER VASCULAR LEFT MID FEMORAL COMPETENT: NORMAL
BH CV LOWER VASCULAR LEFT MID FEMORAL COMPRESS: NORMAL
BH CV LOWER VASCULAR LEFT MID FEMORAL PHASIC: NORMAL
BH CV LOWER VASCULAR LEFT MID FEMORAL SPONT: NORMAL
BH CV LOWER VASCULAR LEFT PERONEAL COMPRESS: NORMAL
BH CV LOWER VASCULAR LEFT POPLITEAL AUGMENT: NORMAL
BH CV LOWER VASCULAR LEFT POPLITEAL COMPETENT: NORMAL
BH CV LOWER VASCULAR LEFT POPLITEAL COMPRESS: NORMAL
BH CV LOWER VASCULAR LEFT POPLITEAL PHASIC: NORMAL
BH CV LOWER VASCULAR LEFT POPLITEAL SPONT: NORMAL
BH CV LOWER VASCULAR LEFT POSTERIOR TIBIAL COMPRESS: NORMAL
BH CV LOWER VASCULAR LEFT PROXIMAL FEMORAL COMPRESS: NORMAL
BH CV LOWER VASCULAR LEFT SAPHENOFEMORAL JUNCTION COMPRESS: NORMAL
BH CV LOWER VASCULAR RIGHT COMMON FEMORAL AUGMENT: NORMAL
BH CV LOWER VASCULAR RIGHT COMMON FEMORAL COMPETENT: NORMAL
BH CV LOWER VASCULAR RIGHT COMMON FEMORAL COMPRESS: NORMAL
BH CV LOWER VASCULAR RIGHT COMMON FEMORAL PHASIC: NORMAL
BH CV LOWER VASCULAR RIGHT COMMON FEMORAL SPONT: NORMAL
BH CV LOWER VASCULAR RIGHT DISTAL FEMORAL COMPRESS: NORMAL
BH CV LOWER VASCULAR RIGHT MID FEMORAL AUGMENT: NORMAL
BH CV LOWER VASCULAR RIGHT MID FEMORAL COMPETENT: NORMAL
BH CV LOWER VASCULAR RIGHT MID FEMORAL COMPRESS: NORMAL
BH CV LOWER VASCULAR RIGHT MID FEMORAL PHASIC: NORMAL
BH CV LOWER VASCULAR RIGHT MID FEMORAL SPONT: NORMAL
BH CV LOWER VASCULAR RIGHT PERONEAL COMPRESS: NORMAL
BH CV LOWER VASCULAR RIGHT POPLITEAL AUGMENT: NORMAL
BH CV LOWER VASCULAR RIGHT POPLITEAL COMPETENT: NORMAL
BH CV LOWER VASCULAR RIGHT POPLITEAL COMPRESS: NORMAL
BH CV LOWER VASCULAR RIGHT POPLITEAL PHASIC: NORMAL
BH CV LOWER VASCULAR RIGHT POPLITEAL SPONT: NORMAL
BH CV LOWER VASCULAR RIGHT POSTERIOR TIBIAL COMPRESS: NORMAL
BH CV LOWER VASCULAR RIGHT PROXIMAL FEMORAL COMPRESS: NORMAL
BH CV LOWER VASCULAR RIGHT SAPHENOFEMORAL JUNCTION COMPRESS: NORMAL
BH CV VAS PRELIMINARY FINDINGS SCRIPTING: 1
BH CV VAS PRELIMINARY FINDINGS SCRIPTING: 1
BILIRUB SERPL-MCNC: 0.3 MG/DL (ref 0–1.2)
BUN SERPL-MCNC: 10 MG/DL (ref 6–20)
BUN/CREAT SERPL: 16.1 (ref 7–25)
CALCIUM SPEC-SCNC: 9.6 MG/DL (ref 8.6–10.5)
CHLORIDE SERPL-SCNC: 106 MMOL/L (ref 98–107)
CK SERPL-CCNC: 121 U/L (ref 20–200)
CO2 SERPL-SCNC: 27.9 MMOL/L (ref 22–29)
CREAT SERPL-MCNC: 0.62 MG/DL (ref 0.76–1.27)
D-LACTATE SERPL-SCNC: 0.9 MMOL/L (ref 0.5–2)
DEPRECATED RDW RBC AUTO: 46.7 FL (ref 37–54)
EGFRCR SERPLBLD CKD-EPI 2021: 110.1 ML/MIN/1.73
EOSINOPHIL # BLD AUTO: 0.27 10*3/MM3 (ref 0–0.4)
EOSINOPHIL NFR BLD AUTO: 2.1 % (ref 0.3–6.2)
ERYTHROCYTE [DISTWIDTH] IN BLOOD BY AUTOMATED COUNT: 13.4 % (ref 12.3–15.4)
GLOBULIN UR ELPH-MCNC: 2.7 GM/DL
GLUCOSE SERPL-MCNC: 96 MG/DL (ref 65–99)
HCT VFR BLD AUTO: 41.2 % (ref 37.5–51)
HGB BLD-MCNC: 13.9 G/DL (ref 13–17.7)
IMM GRANULOCYTES # BLD AUTO: 0.05 10*3/MM3 (ref 0–0.05)
IMM GRANULOCYTES NFR BLD AUTO: 0.4 % (ref 0–0.5)
INR PPP: 2.75 (ref 0.86–1.15)
LYMPHOCYTES # BLD AUTO: 2.95 10*3/MM3 (ref 0.7–3.1)
LYMPHOCYTES NFR BLD AUTO: 22.5 % (ref 19.6–45.3)
MAGNESIUM SERPL-MCNC: 1.8 MG/DL (ref 1.6–2.6)
MAXIMAL PREDICTED HEART RATE: 161 BPM
MAXIMAL PREDICTED HEART RATE: 161 BPM
MCH RBC QN AUTO: 31.7 PG (ref 26.6–33)
MCHC RBC AUTO-ENTMCNC: 33.7 G/DL (ref 31.5–35.7)
MCV RBC AUTO: 94.1 FL (ref 79–97)
MONOCYTES # BLD AUTO: 0.77 10*3/MM3 (ref 0.1–0.9)
MONOCYTES NFR BLD AUTO: 5.9 % (ref 5–12)
NEUTROPHILS NFR BLD AUTO: 68.6 % (ref 42.7–76)
NEUTROPHILS NFR BLD AUTO: 8.99 10*3/MM3 (ref 1.7–7)
NRBC BLD AUTO-RTO: 0 /100 WBC (ref 0–0.2)
NT-PROBNP SERPL-MCNC: 122.1 PG/ML (ref 0–900)
PLATELET # BLD AUTO: 366 10*3/MM3 (ref 140–450)
PMV BLD AUTO: 8.7 FL (ref 6–12)
POTASSIUM SERPL-SCNC: 4.2 MMOL/L (ref 3.5–5.2)
PROT SERPL-MCNC: 7.2 G/DL (ref 6–8.5)
PROTHROMBIN TIME: 28.6 SECONDS (ref 11.8–14.9)
QT INTERVAL: 370 MS
QT INTERVAL: 370 MS
RBC # BLD AUTO: 4.38 10*6/MM3 (ref 4.14–5.8)
SODIUM SERPL-SCNC: 145 MMOL/L (ref 136–145)
STRESS TARGET HR: 137 BPM
STRESS TARGET HR: 137 BPM
T4 FREE SERPL-MCNC: 1.38 NG/DL (ref 0.93–1.7)
TROPONIN T SERPL HS-MCNC: 13 NG/L
TSH SERPL DL<=0.05 MIU/L-ACNC: 1.23 UIU/ML (ref 0.27–4.2)
UPPER ARTERIAL LEFT ARM BRACHIAL SYS MAX: 200 MMHG
UPPER ARTERIAL RIGHT ARM BRACHIAL SYS MAX: 150 MMHG
WBC NRBC COR # BLD: 13.1 10*3/MM3 (ref 3.4–10.8)

## 2023-03-13 PROCEDURE — 25010000002 HYDROMORPHONE 1 MG/ML SOLUTION: Performed by: EMERGENCY MEDICINE

## 2023-03-13 PROCEDURE — 84443 ASSAY THYROID STIM HORMONE: CPT | Performed by: EMERGENCY MEDICINE

## 2023-03-13 PROCEDURE — 80053 COMPREHEN METABOLIC PANEL: CPT | Performed by: EMERGENCY MEDICINE

## 2023-03-13 PROCEDURE — 93005 ELECTROCARDIOGRAM TRACING: CPT | Performed by: EMERGENCY MEDICINE

## 2023-03-13 PROCEDURE — 83605 ASSAY OF LACTIC ACID: CPT | Performed by: EMERGENCY MEDICINE

## 2023-03-13 PROCEDURE — 83880 ASSAY OF NATRIURETIC PEPTIDE: CPT | Performed by: EMERGENCY MEDICINE

## 2023-03-13 PROCEDURE — 84484 ASSAY OF TROPONIN QUANT: CPT | Performed by: EMERGENCY MEDICINE

## 2023-03-13 PROCEDURE — 25010000002 HEPARIN (PORCINE) PER 1000 UNITS: Performed by: EMERGENCY MEDICINE

## 2023-03-13 PROCEDURE — 75635 CT ANGIO ABDOMINAL ARTERIES: CPT

## 2023-03-13 PROCEDURE — 82550 ASSAY OF CK (CPK): CPT | Performed by: EMERGENCY MEDICINE

## 2023-03-13 PROCEDURE — 93970 EXTREMITY STUDY: CPT | Performed by: SURGERY

## 2023-03-13 PROCEDURE — 36415 COLL VENOUS BLD VENIPUNCTURE: CPT | Performed by: EMERGENCY MEDICINE

## 2023-03-13 PROCEDURE — 99223 1ST HOSP IP/OBS HIGH 75: CPT | Performed by: INTERNAL MEDICINE

## 2023-03-13 PROCEDURE — 25010000002 KETOROLAC TROMETHAMINE PER 15 MG: Performed by: INTERNAL MEDICINE

## 2023-03-13 PROCEDURE — 84439 ASSAY OF FREE THYROXINE: CPT | Performed by: EMERGENCY MEDICINE

## 2023-03-13 PROCEDURE — 85610 PROTHROMBIN TIME: CPT | Performed by: EMERGENCY MEDICINE

## 2023-03-13 PROCEDURE — 85025 COMPLETE CBC W/AUTO DIFF WBC: CPT | Performed by: EMERGENCY MEDICINE

## 2023-03-13 PROCEDURE — 99285 EMERGENCY DEPT VISIT HI MDM: CPT

## 2023-03-13 PROCEDURE — 83735 ASSAY OF MAGNESIUM: CPT | Performed by: EMERGENCY MEDICINE

## 2023-03-13 PROCEDURE — 85730 THROMBOPLASTIN TIME PARTIAL: CPT | Performed by: EMERGENCY MEDICINE

## 2023-03-13 PROCEDURE — 93923 UPR/LXTR ART STDY 3+ LVLS: CPT | Performed by: SURGERY

## 2023-03-13 PROCEDURE — 93970 EXTREMITY STUDY: CPT

## 2023-03-13 PROCEDURE — 93923 UPR/LXTR ART STDY 3+ LVLS: CPT

## 2023-03-13 PROCEDURE — 25510000001 IOPAMIDOL PER 1 ML: Performed by: EMERGENCY MEDICINE

## 2023-03-13 RX ORDER — NICOTINE 21 MG/24HR
1 PATCH, TRANSDERMAL 24 HOURS TRANSDERMAL
Status: DISCONTINUED | OUTPATIENT
Start: 2023-03-14 | End: 2023-03-14

## 2023-03-13 RX ORDER — KETOROLAC TROMETHAMINE 30 MG/ML
30 INJECTION, SOLUTION INTRAMUSCULAR; INTRAVENOUS EVERY 6 HOURS PRN
Status: DISCONTINUED | OUTPATIENT
Start: 2023-03-13 | End: 2023-03-15 | Stop reason: HOSPADM

## 2023-03-13 RX ORDER — HEPARIN SOD,PORCINE/0.9 % NACL 25000/250
18 INTRAVENOUS SOLUTION INTRAVENOUS
Status: DISCONTINUED | OUTPATIENT
Start: 2023-03-13 | End: 2023-03-14

## 2023-03-13 RX ORDER — SODIUM CHLORIDE 9 MG/ML
40 INJECTION, SOLUTION INTRAVENOUS AS NEEDED
Status: DISCONTINUED | OUTPATIENT
Start: 2023-03-13 | End: 2023-03-15 | Stop reason: HOSPADM

## 2023-03-13 RX ORDER — SODIUM CHLORIDE 0.9 % (FLUSH) 0.9 %
10 SYRINGE (ML) INJECTION AS NEEDED
Status: DISCONTINUED | OUTPATIENT
Start: 2023-03-13 | End: 2023-03-15 | Stop reason: HOSPADM

## 2023-03-13 RX ORDER — ACETAMINOPHEN 325 MG/1
650 TABLET ORAL EVERY 4 HOURS PRN
Status: DISCONTINUED | OUTPATIENT
Start: 2023-03-13 | End: 2023-03-15 | Stop reason: HOSPADM

## 2023-03-13 RX ORDER — SODIUM CHLORIDE 0.9 % (FLUSH) 0.9 %
10 SYRINGE (ML) INJECTION EVERY 12 HOURS SCHEDULED
Status: DISCONTINUED | OUTPATIENT
Start: 2023-03-13 | End: 2023-03-15 | Stop reason: HOSPADM

## 2023-03-13 RX ORDER — NALOXONE HCL 0.4 MG/ML
0.4 VIAL (ML) INJECTION
Status: DISCONTINUED | OUTPATIENT
Start: 2023-03-13 | End: 2023-03-15 | Stop reason: HOSPADM

## 2023-03-13 RX ORDER — SODIUM CHLORIDE 9 MG/ML
125 INJECTION, SOLUTION INTRAVENOUS CONTINUOUS
Status: DISCONTINUED | OUTPATIENT
Start: 2023-03-13 | End: 2023-03-14

## 2023-03-13 RX ADMIN — HEPARIN SODIUM 18 UNITS/KG/HR: 5000 INJECTION INTRAVENOUS; SUBCUTANEOUS at 18:23

## 2023-03-13 RX ADMIN — IOPAMIDOL 100 ML: 755 INJECTION, SOLUTION INTRAVENOUS at 16:37

## 2023-03-13 RX ADMIN — KETOROLAC TROMETHAMINE 30 MG: 30 INJECTION, SOLUTION INTRAMUSCULAR; INTRAVENOUS at 21:32

## 2023-03-13 RX ADMIN — SODIUM CHLORIDE 125 ML/HR: 9 INJECTION, SOLUTION INTRAVENOUS at 18:50

## 2023-03-13 RX ADMIN — HYDROMORPHONE HYDROCHLORIDE 1 MG: 1 INJECTION, SOLUTION INTRAMUSCULAR; INTRAVENOUS; SUBCUTANEOUS at 15:52

## 2023-03-13 NOTE — ED NOTES
"Pt states he has not tried elevating feet or using compression socks to decrease swelling because \"it hurts too bad.\" Pt states that he has no known underlying health conditions  "

## 2023-03-13 NOTE — ED PROVIDER NOTES
Time: 11:20 AM EDT  Date of encounter:  3/13/2023  Independent Historian/Clinical History and Information was obtained by:   Patient and Family  Chief Complaint: foot swelling     History is limited by: N/A    History of Present Illness:  Patient is a 58 y.o. year old male who presents to the emergency department for evaluation of foot swelling. Family states that patient has been having foot swelling for the past 3 months. Patient states that he is a smoker. Patient states that when he walks he has mild leg and moderate feet pain. Patient states that at rest, his bilateral feet pain is 9/10      Foot Pain  Location:  Bilateral   Quality:  Swelling   Severity:  Moderate  Duration:  3 months  Worsened by:  Walking   Ineffective treatments:  Elevation and compression socks   Associated symptoms: no abdominal pain, no chest pain, no congestion, no cough, no diarrhea, no fever, no headaches, no myalgias, no nausea, no rhinorrhea, no shortness of breath, no sore throat and no vomiting        Patient Care Team  Primary Care Provider: Provider, No Known    Past Medical History:     No Known Allergies  Past Medical History:   Diagnosis Date   • Callus N/a   • Difficulty walking N/a     History reviewed. No pertinent surgical history.  Family History   Problem Relation Age of Onset   • Heart disease Mother    • Heart disease Brother    • Cancer Neg Hx    • Diabetes Neg Hx    • Hypertension Neg Hx    • Thyroid disease Neg Hx        Home Medications:  Prior to Admission medications    Medication Sig Start Date End Date Taking? Authorizing Provider   amoxicillin-clavulanate (AUGMENTIN) 875-125 MG per tablet Take 1 tablet by mouth Every 12 (Twelve) Hours. 2/18/23 3/13/23  Eda De La O APRN   terbinafine (lamISIL) 1 % cream Apply 1 application topically to the appropriate area as directed Every Night. 2/18/23 3/13/23  Eda De La O APRN        Social History:   Social History     Tobacco Use   • Smoking status: Every  "Day     Packs/day: 1.00     Years: 40.00     Pack years: 40.00     Types: Cigarettes   • Smokeless tobacco: Never   Vaping Use   • Vaping Use: Never used   Substance Use Topics   • Alcohol use: Never   • Drug use: Yes     Frequency: 4.0 times per week     Types: Marijuana     Comment: couple times a week         Review of Systems:  Review of Systems   Constitutional: Negative for chills and fever.   HENT: Negative for congestion, rhinorrhea and sore throat.    Eyes: Negative for pain and visual disturbance.   Respiratory: Negative for apnea, cough, chest tightness and shortness of breath.    Cardiovascular: Negative for chest pain and palpitations.   Gastrointestinal: Negative for abdominal pain, diarrhea, nausea and vomiting.   Genitourinary: Negative for difficulty urinating and dysuria.   Musculoskeletal: Positive for gait problem. Negative for joint swelling and myalgias.   Skin: Negative for color change.   Neurological: Negative for seizures and headaches.   Psychiatric/Behavioral: Negative.    All other systems reviewed and are negative.       Physical Exam:  /92   Pulse 85   Temp 98 °F (36.7 °C) (Oral)   Resp 18   Ht 180.3 cm (71\")   Wt 68 kg (150 lb)   SpO2 97%   BMI 20.92 kg/m²     Physical Exam  Vitals and nursing note reviewed.   Constitutional:       General: He is not in acute distress.     Appearance: Normal appearance. He is not toxic-appearing.   HENT:      Head: Normocephalic and atraumatic.      Jaw: There is normal jaw occlusion.   Eyes:      General: Lids are normal.      Extraocular Movements: Extraocular movements intact.      Conjunctiva/sclera: Conjunctivae normal.      Pupils: Pupils are equal, round, and reactive to light.   Cardiovascular:      Rate and Rhythm: Normal rate and regular rhythm.      Pulses: Normal pulses.      Heart sounds: Normal heart sounds.   Pulmonary:      Effort: Pulmonary effort is normal. No respiratory distress.      Breath sounds: Normal breath " sounds. No wheezing or rhonchi.   Abdominal:      General: Abdomen is flat.      Palpations: Abdomen is soft.      Tenderness: There is no abdominal tenderness. There is no guarding or rebound.   Musculoskeletal:         General: Normal range of motion.      Cervical back: Normal range of motion and neck supple.      Right lower leg: No edema.      Left lower leg: No edema.   Feet:      Comments: Bilateral 3+ pedal edema   Skin:     General: Skin is warm and dry.   Neurological:      Mental Status: He is alert and oriented to person, place, and time. Mental status is at baseline.   Psychiatric:         Mood and Affect: Mood normal.                  Procedures:  Procedures      Medical Decision Making:      Comorbidities that affect care:    Callus, Smoking     External Notes reviewed:    Previous Clinic Note: Patient was recently seen by podiatry for a great toe infection.      The following orders were placed and all results were independently analyzed by me:  Orders Placed This Encounter   Procedures   • CT Angio Abdominal Aorta Bilateral Iliofem Runoff   • Comprehensive Metabolic Panel   • Lactic Acid, Plasma   • T4, Free   • TSH   • Magnesium   • CBC Auto Differential   • BNP   • Single High Sensitivity Troponin T   • CK   • Protime-INR   • aPTT   • aPTT   • Notify Provider Platelet Count Less Than 22315   • Notify Provider if PTT Not in Therapeutic Range After 24 Hours   • Stop Infusion & Notify Provider if Bleeding Occurs   • RN To Release aPTT Order 6 Hours After Heparin Bolus & 6 Hours After Any Heparin Rate Change   • After 2 Consecutive Therapeutic aPTTs, Obtain aPTT Daily.  If a Rate Adjustment is Necessary, Resume Every 6 Hour aPTT Draws   • Inpatient Vascular Surgery Consult   • Inpatient Hospitalist Consult   • ECG 12 Lead Chest Pain   • ECG 12 Lead Chest Pain   • CBC & Differential   • CBC & Differential       Medications Given in the Emergency Department:  Medications   heparin bolus from bag 5,400  Units (has no administration in time range)   heparin 24831 units/250 mL (100 units/mL) in 0.9% NaCl infusion (has no administration in time range)   heparin bolus from bag 3,400 Units (has no administration in time range)   heparin bolus from bag 1,700 Units (has no administration in time range)   HYDROmorphone (DILAUDID) injection 1 mg (1 mg Intravenous Given 3/13/23 1552)   iopamidol (ISOVUE-370) 76 % injection 100 mL (100 mL Intravenous Given 3/13/23 1637)        ED Course:         Labs:    Lab Results (last 24 hours)     Procedure Component Value Units Date/Time    CBC & Differential [[id number]]  (Abnormal) Collected: 03/13/23 1151    Specimen: Blood Updated: 03/13/23 1210    Narrative:      The following orders were created for panel order CBC & Differential.  Procedure                               Abnormality         Status                     ---------                               -----------         ------                     CBC Auto Differential[[id number]]        Abnormal            Final result                 Please view results for these tests on the individual orders.    Comprehensive Metabolic Panel [[id number]]  (Abnormal) Collected: 03/13/23 1151    Specimen: Blood Updated: 03/13/23 1229     Glucose 96 mg/dL      BUN 10 mg/dL      Creatinine 0.62 mg/dL      Sodium 145 mmol/L      Potassium 4.2 mmol/L      Chloride 106 mmol/L      CO2 27.9 mmol/L      Calcium 9.6 mg/dL      Total Protein 7.2 g/dL      Albumin 4.5 g/dL      ALT (SGPT) 20 U/L      AST (SGOT) 17 U/L      Alkaline Phosphatase 108 U/L      Total Bilirubin 0.3 mg/dL      Globulin 2.7 gm/dL      A/G Ratio 1.7 g/dL      BUN/Creatinine Ratio 16.1     Anion Gap 11.1 mmol/L      eGFR 110.1 mL/min/1.73     Narrative:      GFR Normal >60  Chronic Kidney Disease <60  Kidney Failure <15      Lactic Acid, Plasma [[id number]]  (Normal) Collected: 03/13/23 1151    Specimen: Blood Updated: 03/13/23 1227     Lactate 0.9 mmol/L     T4, Free [[id number]]   (Normal) Collected: 03/13/23 1151    Specimen: Blood Updated: 03/13/23 1233     Free T4 1.38 ng/dL     Narrative:      Results may be falsely increased if patient taking Biotin.      TSH [[id number]]  (Normal) Collected: 03/13/23 1151    Specimen: Blood Updated: 03/13/23 1233     TSH 1.230 uIU/mL     Magnesium [[id number]]  (Normal) Collected: 03/13/23 1151    Specimen: Blood Updated: 03/13/23 1229     Magnesium 1.8 mg/dL     CBC Auto Differential [[id number]]  (Abnormal) Collected: 03/13/23 1151    Specimen: Blood Updated: 03/13/23 1210     WBC 13.10 10*3/mm3      RBC 4.38 10*6/mm3      Hemoglobin 13.9 g/dL      Hematocrit 41.2 %      MCV 94.1 fL      MCH 31.7 pg      MCHC 33.7 g/dL      RDW 13.4 %      RDW-SD 46.7 fl      MPV 8.7 fL      Platelets 366 10*3/mm3      Neutrophil % 68.6 %      Lymphocyte % 22.5 %      Monocyte % 5.9 %      Eosinophil % 2.1 %      Basophil % 0.5 %      Immature Grans % 0.4 %      Neutrophils, Absolute 8.99 10*3/mm3      Lymphocytes, Absolute 2.95 10*3/mm3      Monocytes, Absolute 0.77 10*3/mm3      Eosinophils, Absolute 0.27 10*3/mm3      Basophils, Absolute 0.07 10*3/mm3      Immature Grans, Absolute 0.05 10*3/mm3      nRBC 0.0 /100 WBC     BNP [[id number]]  (Normal) Collected: 03/13/23 1151    Specimen: Blood Updated: 03/13/23 1233     proBNP 122.1 pg/mL     Narrative:      Among patients with dyspnea, NT-proBNP is highly sensitive for the detection of acute congestive heart failure. In addition NT-proBNP of <300 pg/ml effectively rules out acute congestive heart failure with 99% negative predictive value.    Results may be falsely decreased if patient taking Biotin.      Single High Sensitivity Troponin T [[id number]]  (Normal) Collected: 03/13/23 1151    Specimen: Blood Updated: 03/13/23 1233     HS Troponin T 13 ng/L     Narrative:      High Sensitive Troponin T Reference Range:  <10.0 ng/L- Negative Female for AMI  <15.0 ng/L- Negative Male for AMI  >=10 - Abnormal Female indicating  possible myocardial injury.  >=15 - Abnormal Male indicating possible myocardial injury.   Clinicians would have to utilize clinical acumen, EKG, Troponin, and serial changes to determine if it is an Acute Myocardial Infarction or myocardial injury due to an underlying chronic condition.                Imaging:    CT Angio Abdominal Aorta Bilateral Iliofem Runoff    Result Date: 3/13/2023  PROCEDURE: CT ANGIO ABDOMINAL AORTA BILAT ILIOFEM RUNOFF W WO CONTRAST  COMPARISON: None  INDICATIONS: evaluate arterial insufficiency,patient has been having rt foot swelling for the past 3 months w/pain  TECHNIQUE: After obtaining the patient's consent, CT images of the abdomen, pelvis, and lower extremities were obtained without and with non-ionic intravenous contrast material. Multi-planar reformatted/3-D images were created to optimize visualization of vascular anatomy.   PROTOCOL:   Standard imaging protocol performed    RADIATION:   DLP: 521.6mGy*cm   Automated exposure control was utilized to minimize radiation dose. CONTRAST: 100cc Isovue 370 I.V.  FINDINGS:  Vascular:  The abdominal aorta is widely patent and normal in caliber with moderate plaque distally.  The celiac trunk is widely patent.  The superior mesenteric artery is widely patent.  The bilateral renal arteries are widely patent with mild plaque proximally.  The inferior mesenteric artery is widely patent.  There is severe stenosis along the right common iliac artery but it is patent throughout.  The left common iliac artery is widely patent with moderate plaque proximally.  The bilateral internal iliac arteries are patent with moderate plaque.  There is severe atherosclerotic disease involving the right external iliac artery with areas of occlusion proximally and distally.  There is occlusion of most of the left external iliac artery.  Right lower extremity:  The common femoral artery is reconstituted via collateral flow.  The profunda femoral artery is widely  patent.  The superficial femoral artery is patent throughout with severe stenosis proximally.  The popliteal artery is widely patent with mild plaque.  The trifurcation arteries of the leg are patent.  The dorsalis pedis artery is patent.  Left lower extremity:  The common femoral artery is reconstituted via collateral flow.  The profunda femoral artery is widely patent.  The superficial femoral artery is widely patent.  The popliteal artery is widely patent with moderate plaque proximally.  The trifurcation arteries of the leg are patent.  The dorsalis pedis artery is patent.  Nonvascular:  The lung bases are clear.  The liver, adrenal glands, kidneys, spleen, and pancreas are unremarkable.  There is a stone in the gallbladder.  The stomach appears normal.  The small bowel appears normal in caliber and configuration.  The colon appears normal.  The appendix is not well visualized.  There is no ascites or loculated collection.  No abnormally enlarged lymph nodes are identified.  The rectum, prostate, and urinary bladder are unremarkable.  No aggressive osseous lesions are identified.        1. Multifocal atherosclerotic disease as described above, most prominent involving areas of occlusion of the bilateral external iliac arteries as described above. 2. No acute process identified within abdomen/pelvis. 3. Cholelithiasis.     LAYLA MOELLER MD       Electronically Signed and Approved By: LAYLA MOELLER MD on 3/13/2023 at 18:11             Duplex Venous Lower Extremity - Bilateral CAR    Result Date: 3/13/2023  •  Normal bilateral lower extremity venous duplex scan.     Doppler Arterial Multi Level Lower Extremity - Bilateral CAR    Result Date: 3/13/2023  •  Right Conclusion: The right MARCELINA is critically reduced. Waveforms are consistent with femoral disease, popliteal disease and tib-peroneal disease. Severe digital ischemia. •  Left Conclusion: The left MARCELINA is severely reduced. Waveforms are consistent with  femoral disease. Severe digital ischemia. •  Vascular surgical evaluation would be appropriate regarding these findings         Differential Diagnosis and Discussion:    Extremity Pain: Differential diagnosis includes but is not limited to soft tissue sprain, tendonitis, tendon injury, dislocation, fracture, deep vein thrombosis, arterial insufficiency, osteoarthritis, bursitis, and ligamentous damage.    All labs were reviewed and interpreted by me.  EKG was interpreted by me.  Ultrasound interpretation was reviewed by me.     MDM  Number of Diagnoses or Management Options  Arterial occlusion  Diagnosis management comments: The patient´s CBC that was reviewed and interpreted by me shows no abnormalities of critical concern. Of note, there is no anemia requiring a blood transfusion and the platelet count is acceptable.  The patient´s CMP that was reviewed and interpretted by me shows no abnormalities of critical concern. Of note, the patient´s sodium and potassium are acceptable. The patient´s liver enzymes are unremarkable. The patient´s renal function (creatinine) is preserved. The patient has a normal anion gap.  Troponin is negative.  TSH is 1.2.  BNP is 122.  Lactic acid is 0.9.  Magnesium is 1.8.  Ultrasound lower extremities negative for DVT.  It does show significant diminished arterial flow.       Amount and/or Complexity of Data Reviewed  Clinical lab tests: reviewed  Tests in the radiology section of CPT®: reviewed  Tests in the medicine section of CPT®: reviewed  Obtain history from someone other than the patient: yes (Family states that patient has scheduled appointment with Podiatry on 3/24/23)  Independent visualization of images, tracings, or specimens: yes    Risk of Complications, Morbidity, and/or Mortality  Presenting problems: high  Management options: high    Patient Progress  Patient progress: stable       Critical Care Note: Total Critical Care time of 50 minutes. Total critical care time  documented does not include time spent on separately billed procedures for services of nurses or physician assistants. I personally saw and examined the patient. I have reviewed all diagnostic interpretations and treatment plans as written. I was present for the key portions of any procedures performed and the inclusive time noted in any critical care statement. Critical care time includes patient management by me, time spent at the patients bedside,  time to review lab and imaging results, discussing patient care, documentation in the medical record, and time spent with family or caregiver.    Patient Care Considerations:    none      Consultants/Shared Management Plan:    Hospitalist: I have discussed the case with Dr. Hennessy who agrees to accept the patient for admission.  Consultant: I have discussed the case with Dr. Bravo who agrees to consult on the patient.    Social Determinants of Health:    Patient is independent, reliable, and has access to care.       Disposition and Care Coordination:    Admit:   Through independent evaluation of the patient's history, physical, and imperical data, the patient meets criteria for observation/admission to the hospital.        Final diagnoses:   Arterial occlusion        ED Disposition     ED Disposition   Decision to Admit    Condition   --    Comment   --             This medical record created using voice recognition software.        Documentation assistance provided by Chiara Dennis acting as scribe for  Amelie Torrez MD . Information recorded by the scribe was done at my direction and has been verified and validated by me.       Chiara Dennis  03/13/23 1130       Amelie Valladares MD  03/13/23 1811       Amelie Valladares MD  03/13/23 1820

## 2023-03-14 VITALS
TEMPERATURE: 97.7 F | WEIGHT: 150 LBS | SYSTOLIC BLOOD PRESSURE: 173 MMHG | HEIGHT: 71 IN | BODY MASS INDEX: 21 KG/M2 | RESPIRATION RATE: 18 BRPM | DIASTOLIC BLOOD PRESSURE: 77 MMHG | OXYGEN SATURATION: 95 % | HEART RATE: 75 BPM

## 2023-03-14 LAB
ANION GAP SERPL CALCULATED.3IONS-SCNC: 8.7 MMOL/L (ref 5–15)
APTT PPP: 112.4 SECONDS (ref 78–95.9)
APTT PPP: 31.8 SECONDS (ref 78–95.9)
APTT PPP: 82.2 SECONDS (ref 78–95.9)
APTT PPP: 88.8 SECONDS (ref 78–95.9)
BASOPHILS # BLD AUTO: 0.09 10*3/MM3 (ref 0–0.2)
BASOPHILS NFR BLD AUTO: 0.7 % (ref 0–1.5)
BUN SERPL-MCNC: 12 MG/DL (ref 6–20)
BUN/CREAT SERPL: 18.5 (ref 7–25)
CALCIUM SPEC-SCNC: 8.9 MG/DL (ref 8.6–10.5)
CHLORIDE SERPL-SCNC: 106 MMOL/L (ref 98–107)
CO2 SERPL-SCNC: 26.3 MMOL/L (ref 22–29)
CREAT SERPL-MCNC: 0.65 MG/DL (ref 0.76–1.27)
DEPRECATED RDW RBC AUTO: 46.5 FL (ref 37–54)
EGFRCR SERPLBLD CKD-EPI 2021: 108.5 ML/MIN/1.73
EOSINOPHIL # BLD AUTO: 0.36 10*3/MM3 (ref 0–0.4)
EOSINOPHIL NFR BLD AUTO: 2.8 % (ref 0.3–6.2)
ERYTHROCYTE [DISTWIDTH] IN BLOOD BY AUTOMATED COUNT: 13.4 % (ref 12.3–15.4)
GLUCOSE SERPL-MCNC: 139 MG/DL (ref 65–99)
HCT VFR BLD AUTO: 37.2 % (ref 37.5–51)
HGB BLD-MCNC: 12.4 G/DL (ref 13–17.7)
HOLD SPECIMEN: NORMAL
IMM GRANULOCYTES # BLD AUTO: 0.03 10*3/MM3 (ref 0–0.05)
IMM GRANULOCYTES NFR BLD AUTO: 0.2 % (ref 0–0.5)
INR PPP: 1 (ref 0.86–1.15)
LYMPHOCYTES # BLD AUTO: 3.86 10*3/MM3 (ref 0.7–3.1)
LYMPHOCYTES NFR BLD AUTO: 30.5 % (ref 19.6–45.3)
MAGNESIUM SERPL-MCNC: 1.8 MG/DL (ref 1.6–2.6)
MCH RBC QN AUTO: 31.4 PG (ref 26.6–33)
MCHC RBC AUTO-ENTMCNC: 33.3 G/DL (ref 31.5–35.7)
MCV RBC AUTO: 94.2 FL (ref 79–97)
MONOCYTES # BLD AUTO: 0.77 10*3/MM3 (ref 0.1–0.9)
MONOCYTES NFR BLD AUTO: 6.1 % (ref 5–12)
NEUTROPHILS NFR BLD AUTO: 59.7 % (ref 42.7–76)
NEUTROPHILS NFR BLD AUTO: 7.54 10*3/MM3 (ref 1.7–7)
NRBC BLD AUTO-RTO: 0 /100 WBC (ref 0–0.2)
PLATELET # BLD AUTO: 321 10*3/MM3 (ref 140–450)
PMV BLD AUTO: 8.8 FL (ref 6–12)
POTASSIUM SERPL-SCNC: 3.8 MMOL/L (ref 3.5–5.2)
PROTHROMBIN TIME: 13.3 SECONDS (ref 11.8–14.9)
RBC # BLD AUTO: 3.95 10*6/MM3 (ref 4.14–5.8)
SODIUM SERPL-SCNC: 141 MMOL/L (ref 136–145)
WBC NRBC COR # BLD: 12.65 10*3/MM3 (ref 3.4–10.8)
WHOLE BLOOD HOLD SPECIMEN: NORMAL

## 2023-03-14 PROCEDURE — 25010000002 HYDROMORPHONE 1 MG/ML SOLUTION: Performed by: INTERNAL MEDICINE

## 2023-03-14 PROCEDURE — 99239 HOSP IP/OBS DSCHRG MGMT >30: CPT | Performed by: INTERNAL MEDICINE

## 2023-03-14 PROCEDURE — 85025 COMPLETE CBC W/AUTO DIFF WBC: CPT | Performed by: EMERGENCY MEDICINE

## 2023-03-14 PROCEDURE — 85730 THROMBOPLASTIN TIME PARTIAL: CPT | Performed by: INTERNAL MEDICINE

## 2023-03-14 PROCEDURE — 99221 1ST HOSP IP/OBS SF/LOW 40: CPT | Performed by: SURGERY

## 2023-03-14 PROCEDURE — 97161 PT EVAL LOW COMPLEX 20 MIN: CPT

## 2023-03-14 PROCEDURE — 85610 PROTHROMBIN TIME: CPT | Performed by: SURGERY

## 2023-03-14 PROCEDURE — 85730 THROMBOPLASTIN TIME PARTIAL: CPT | Performed by: SURGERY

## 2023-03-14 PROCEDURE — 97165 OT EVAL LOW COMPLEX 30 MIN: CPT

## 2023-03-14 PROCEDURE — 80048 BASIC METABOLIC PNL TOTAL CA: CPT | Performed by: INTERNAL MEDICINE

## 2023-03-14 PROCEDURE — 83735 ASSAY OF MAGNESIUM: CPT | Performed by: INTERNAL MEDICINE

## 2023-03-14 PROCEDURE — 25010000002 ENOXAPARIN PER 10 MG: Performed by: INTERNAL MEDICINE

## 2023-03-14 RX ORDER — BISACODYL 10 MG
10 SUPPOSITORY, RECTAL RECTAL DAILY PRN
Status: DISCONTINUED | OUTPATIENT
Start: 2023-03-14 | End: 2023-03-15 | Stop reason: HOSPADM

## 2023-03-14 RX ORDER — ASPIRIN 325 MG
325 TABLET, DELAYED RELEASE (ENTERIC COATED) ORAL DAILY
Status: DISCONTINUED | OUTPATIENT
Start: 2023-03-14 | End: 2023-03-15 | Stop reason: HOSPADM

## 2023-03-14 RX ORDER — ALUMINA, MAGNESIA, AND SIMETHICONE 2400; 2400; 240 MG/30ML; MG/30ML; MG/30ML
15 SUSPENSION ORAL EVERY 6 HOURS PRN
Status: DISCONTINUED | OUTPATIENT
Start: 2023-03-14 | End: 2023-03-15 | Stop reason: HOSPADM

## 2023-03-14 RX ORDER — ONDANSETRON 2 MG/ML
4 INJECTION INTRAMUSCULAR; INTRAVENOUS EVERY 6 HOURS PRN
Status: DISCONTINUED | OUTPATIENT
Start: 2023-03-14 | End: 2023-03-15 | Stop reason: HOSPADM

## 2023-03-14 RX ORDER — ENOXAPARIN SODIUM 100 MG/ML
40 INJECTION SUBCUTANEOUS EVERY 24 HOURS
Qty: 12 ML | Refills: 0 | Status: SHIPPED | OUTPATIENT
Start: 2023-03-14 | End: 2023-04-13

## 2023-03-14 RX ORDER — CALCIUM CARBONATE 200(500)MG
1 TABLET,CHEWABLE ORAL 2 TIMES DAILY PRN
Status: DISCONTINUED | OUTPATIENT
Start: 2023-03-14 | End: 2023-03-15 | Stop reason: HOSPADM

## 2023-03-14 RX ORDER — ATORVASTATIN CALCIUM 20 MG/1
20 TABLET, FILM COATED ORAL NIGHTLY
Status: DISCONTINUED | OUTPATIENT
Start: 2023-03-14 | End: 2023-03-15 | Stop reason: HOSPADM

## 2023-03-14 RX ORDER — BISACODYL 5 MG/1
5 TABLET, DELAYED RELEASE ORAL DAILY PRN
Status: DISCONTINUED | OUTPATIENT
Start: 2023-03-14 | End: 2023-03-15 | Stop reason: HOSPADM

## 2023-03-14 RX ORDER — NICOTINE 21 MG/24HR
1 PATCH, TRANSDERMAL 24 HOURS TRANSDERMAL EVERY 24 HOURS
Status: DISCONTINUED | OUTPATIENT
Start: 2023-03-14 | End: 2023-03-15 | Stop reason: HOSPADM

## 2023-03-14 RX ORDER — NICOTINE 21 MG/24HR
1 PATCH, TRANSDERMAL 24 HOURS TRANSDERMAL EVERY 24 HOURS
Qty: 30 PATCH | Refills: 0 | Status: SHIPPED | OUTPATIENT
Start: 2023-03-14 | End: 2023-04-13

## 2023-03-14 RX ORDER — FAMOTIDINE 40 MG/1
40 TABLET, FILM COATED ORAL DAILY
Qty: 30 TABLET | Refills: 0 | Status: SHIPPED | OUTPATIENT
Start: 2023-03-15 | End: 2023-04-14

## 2023-03-14 RX ORDER — TRAMADOL HYDROCHLORIDE 50 MG/1
50 TABLET ORAL EVERY 6 HOURS PRN
Status: DISCONTINUED | OUTPATIENT
Start: 2023-03-14 | End: 2023-03-15 | Stop reason: HOSPADM

## 2023-03-14 RX ORDER — ATORVASTATIN CALCIUM 20 MG/1
20 TABLET, FILM COATED ORAL NIGHTLY
Qty: 30 TABLET | Refills: 0 | Status: SHIPPED | OUTPATIENT
Start: 2023-03-14 | End: 2023-04-13

## 2023-03-14 RX ORDER — OXYCODONE AND ACETAMINOPHEN 10; 325 MG/1; MG/1
1 TABLET ORAL EVERY 4 HOURS PRN
Status: DISCONTINUED | OUTPATIENT
Start: 2023-03-14 | End: 2023-03-15 | Stop reason: HOSPADM

## 2023-03-14 RX ORDER — POLYETHYLENE GLYCOL 3350 17 G/17G
17 POWDER, FOR SOLUTION ORAL DAILY PRN
Status: DISCONTINUED | OUTPATIENT
Start: 2023-03-14 | End: 2023-03-15 | Stop reason: HOSPADM

## 2023-03-14 RX ORDER — ASPIRIN 325 MG
325 TABLET, DELAYED RELEASE (ENTERIC COATED) ORAL DAILY
Qty: 30 TABLET | Refills: 0 | Status: SHIPPED | OUTPATIENT
Start: 2023-03-15 | End: 2023-04-14

## 2023-03-14 RX ORDER — ENOXAPARIN SODIUM 100 MG/ML
40 INJECTION SUBCUTANEOUS EVERY 24 HOURS
Status: DISCONTINUED | OUTPATIENT
Start: 2023-03-14 | End: 2023-03-15 | Stop reason: HOSPADM

## 2023-03-14 RX ORDER — AMOXICILLIN 250 MG
2 CAPSULE ORAL 2 TIMES DAILY
Status: DISCONTINUED | OUTPATIENT
Start: 2023-03-14 | End: 2023-03-15 | Stop reason: HOSPADM

## 2023-03-14 RX ORDER — FAMOTIDINE 20 MG/1
40 TABLET, FILM COATED ORAL DAILY
Status: DISCONTINUED | OUTPATIENT
Start: 2023-03-14 | End: 2023-03-15 | Stop reason: HOSPADM

## 2023-03-14 RX ADMIN — SODIUM CHLORIDE 125 ML/HR: 9 INJECTION, SOLUTION INTRAVENOUS at 02:53

## 2023-03-14 RX ADMIN — HYDROMORPHONE HYDROCHLORIDE 0.5 MG: 1 INJECTION, SOLUTION INTRAMUSCULAR; INTRAVENOUS; SUBCUTANEOUS at 00:20

## 2023-03-14 RX ADMIN — ASPIRIN 325 MG: 325 TABLET, COATED ORAL at 12:15

## 2023-03-14 RX ADMIN — ATORVASTATIN CALCIUM 20 MG: 20 TABLET, FILM COATED ORAL at 21:34

## 2023-03-14 RX ADMIN — ENOXAPARIN SODIUM 40 MG: 100 INJECTION SUBCUTANEOUS at 16:11

## 2023-03-14 RX ADMIN — HYDROMORPHONE HYDROCHLORIDE 0.5 MG: 1 INJECTION, SOLUTION INTRAMUSCULAR; INTRAVENOUS; SUBCUTANEOUS at 05:48

## 2023-03-14 RX ADMIN — FAMOTIDINE 40 MG: 20 TABLET ORAL at 12:15

## 2023-03-14 RX ADMIN — HYDROMORPHONE HYDROCHLORIDE 0.5 MG: 1 INJECTION, SOLUTION INTRAMUSCULAR; INTRAVENOUS; SUBCUTANEOUS at 19:33

## 2023-03-14 RX ADMIN — HYDROMORPHONE HYDROCHLORIDE 0.5 MG: 1 INJECTION, SOLUTION INTRAMUSCULAR; INTRAVENOUS; SUBCUTANEOUS at 21:40

## 2023-03-14 RX ADMIN — HYDROMORPHONE HYDROCHLORIDE 0.5 MG: 1 INJECTION, SOLUTION INTRAMUSCULAR; INTRAVENOUS; SUBCUTANEOUS at 12:15

## 2023-03-14 RX ADMIN — HYDROMORPHONE HYDROCHLORIDE 0.5 MG: 1 INJECTION, SOLUTION INTRAMUSCULAR; INTRAVENOUS; SUBCUTANEOUS at 16:13

## 2023-03-14 RX ADMIN — Medication 10 ML: at 21:35

## 2023-03-14 NOTE — H&P
[facility]   HISTORY AND PHYSICAL    Patient Name: Marco Antonio Davison  : 1965  MRN: [id number]  Primary Care Physician: Provider, No Known  Date of admission: 3/13/2023    Subjective   Subjective     Chief Complaint: Foot swelling    History of Present Illness  58-year-old male daily smoker presents with right foot swelling and lesion.  Per family, the swelling has been going on for many weeks and is actually bilateral.  The patient recently stubbed his toe on his right foot which opened a lesion.  Thus the patient presents with pain that makes walking very difficult.    The patient denies any other symptoms.  He denies fevers or chills.  He denies any discharge other than blood from the lesion.  He denies any loss of sensation.  He denies nausea, vomiting, chest pain, shortness of breath, abdominal pain, pain with urination, change in bowel habits.      In the ED:  Vitals are largely unremarkable with borderline tachycardia and borderline hypertension.    Labs are largely unremarkable  -WBC 13    CT angio abdominal aorta bilateral iliofemoral runoff:  1. Multifocal atherosclerotic disease as described above, most prominent involving areas of   occlusion of the bilateral external iliac arteries as described above.  2. No acute process identified within abdomen/pelvis.  3. Cholelithiasis.    ED consulted vascular surgery/Dr. Bravo:  -Heparin drip  -N.p.o.  -We will follow-up in the a.m.    Review of Systems   Constitutional: Negative for chills and fever.   HENT: Negative for congestion, rhinorrhea and sore throat.    Eyes: Negative for pain and visual disturbance.   Respiratory: Negative for apnea, cough, chest tightness and shortness of breath.    Cardiovascular: Negative for chest pain and palpitations.   Gastrointestinal: Negative for abdominal pain, diarrhea, nausea and vomiting.   Genitourinary: Negative for difficulty urinating and dysuria.   Musculoskeletal: Positive for gait problem due to pain and  swollen feet. Skin: Negative for color change.   Neurological: Negative for seizures and headaches.   Psychiatric/Behavioral: Negative.    All other systems reviewed and are negative.       Personal History     Past Medical History:   Diagnosis Date   • Callus N/a   • Difficulty walking N/a       History reviewed. No pertinent surgical history.    Family History: family history includes Heart disease in his brother and mother. Otherwise pertinent FHx was reviewed and not pertinent to current issue.    Social History:  reports that he has been smoking cigarettes. He has a 40.00 pack-year smoking history. He has never used smokeless tobacco. He reports current drug use. Frequency: 4.00 times per week. Drug: Marijuana. He reports that he does not drink alcohol.    Home Medications:     Allergies:  No Known Allergies    Objective    Objective     Vitals:  Temp:  [97.2 °F (36.2 °C)-98 °F (36.7 °C)] 97.2 °F (36.2 °C)  Heart Rate:  [] 82  Resp:  [16-18] 16  BP: (150-184)/() 168/79    Physical Exam  Constitutional:       General: He is not in acute distress.     Appearance: Normal appearance. He is not toxic-appearing.   HENT:      Head: Normocephalic and atraumatic.   Eyes:      General: Lids are normal.      Extraocular Movements: Extraocular movements intact.      Conjunctiva/sclera: Conjunctivae normal.      Pupils: Pupils are equal, round, and reactive to light.   Cardiovascular:      Rate and Rhythm: Normal rate and regular rhythm.      Pulses: Normal pulses.      Heart sounds: Normal heart sounds.   Pulmonary:      Effort: Pulmonary effort is normal. No respiratory distress.      Breath sounds: Normal breath sounds. No wheezing or rhonchi.   Abdominal:      General: Abdomen is flat.      Palpations: Abdomen is soft.      Tenderness: There is no abdominal tenderness. There is no guarding or rebound.   Musculoskeletal:         General: Normal range of motion.      Cervical back: Normal range of motion and  neck supple.      Right lower leg: No edema.      Left lower leg: No edema.   Feet:      Comments: Bilateral tense pedal edema   Skin:     General: Skin is warm and dry.   Neurological:      Mental Status: He is alert and oriented to person, place, and time. Mental status is at baseline.   Psychiatric:         Mood and Affect: Mood normal.     Result Review    Result Review:  I have personally reviewed the results from the time of this admission to 3/13/2023 22:06 EDT and agree with these findings:  [x]  Laboratory list / accordion  []  Microbiology  [x]  Radiology  [x]  EKG/Telemetry   []  Cardiology/Vascular   []  Pathology  []  Old records  []  Other:  Most notable findings include: Abnormal arterial flow studies        Assessment & Plan   Assessment / Plan     Brief Patient Summary:  58-year-old male daily smoker presents with right foot swelling and lesion.  Found to have bilateral lower extremity/foot swelling and arterial studies showing significant disease.  Vascular surgery on the case.    Active Hospital Problems:  Active Hospital Problems    Diagnosis    • **Arterial occlusion        Plan:   Lower extremity swelling  Arterial occlusion  -Vascular consulted  -N.p.o.  -Heparin drip  [] Follow-up with vascular for management    DVT prophylaxis: Heparin drip    CODE STATUS:    Code Status and Medical Interventions:   Ordered at: 03/13/23 1829     Code Status (Patient has no pulse and is not breathing):    CPR (Attempt to Resuscitate)     Medical Interventions (Patient has pulse or is breathing):    Full Support       Admission Status:  I believe this patient meets inpatient status.    Pierre Hennessy MD,

## 2023-03-14 NOTE — PLAN OF CARE
Goal Outcome Evaluation:  Plan of Care Reviewed With: patient           Outcome Evaluation: Patient shows minimal deficits with mobility, transfers and gait. He does not need physical therapy services at this time while in the hospital. Patient is okay to go home once discharged from hospital.

## 2023-03-14 NOTE — CONSULTS
[facility]   VASCULAR SURGERY CONSULT    Patient Name: Marco Antonio Davison  : 1965  MRN: [id number]  Primary Care Physician:  Provider, No Known  Date of admission: 3/13/2023    Subjective   Subjective     Chief Complaint: Significant pain to the calves and right tissue ulceration    HPI:    Marco Antonio Davison is a 58 y.o. male smoker who presents to the hospital after 3 months of bilateral lower extremity edema and ulcerations to his right great and fifth toes after stubbing them in the bathroom back in December.  These have failed to heal and the patient was scheduled to see a podiatrist as well as obtain a new PCP here in the next few weeks.  However the pain progressively has worsened to his bilateral calves and the swelling has not resolved.  He states the pain is 9 out of 10 to his feet at its worst sometimes even at rest and he is unable to ambulate any significant distance.  Given these issues he presented to the [facility] for evaluation and underwent a noninvasive MARCELINA study initially showing right to right MARCELINA to be 0.2 and left MARCELINA's to be 0.43.       A CTA was obtained of the aorta with bilateral iliac femoral runoff and this shows extensive multifocal atherosclerotic disease but most notably with bilateral external iliac artery occlusive disease with his internal iliac arteries patent left seems to be more prominent than right.  He reconstitutes his bilateral common femoral arteries in his distal outflow to both legs does seem to be relatively preserved.  This is all consistent with chronic but significant peripheral arterial disease.   Initially the patient was started on a heparin drip in the emergency department however after evaluation and review of his CT scan I do think it is reasonable that the heparin can be discontinued at this point in time.  Also he had an initial INR of 2.75 without being on any anticoagulation and we will recheck his lab this morning with regards to this to see  if it was aberrant or a true coagulopathy.       Review of Systems    General: Patient denies unintended weight loss or weight gain.  Denies fatigue, and weakness.  Denies any fevers, chills or night sweats.  HEENT: Denies any recent visual or hearing changes.  Denies any headaches, rhinorrhea or epistaxis.  Cardiac: Patient denies any significant chest pain, chest tightness or palpitations.  Denies dyspnea on exertion or denies any paroxysmal nocturnal dyspnea orthopnea.  Respiratory: Patient denies any shortness of breath, cough, sputum production or hemoptysis.    Gastrointestinal: Patient denies any significant changes in appetite, nausea, vomiting or dysphagia.  Denies any hematemesis, melena or hematochezia.  Denies any abdominal pain or tenderness to palpation.  Genitourinary: Patient denies any oliguria, dysuria or hematuria.  Vascular: Patient denies any claudication, rest pain or tissue loss.  Denies any history of thrombosis or embolic phenomenon.  Endocrine: Denies any hot or cold intolerance, is not a diabetic and denies any thyroid problems.  Neurologic: Patient denies any loss of sensation, numbness or tingling.  Denies any syncopal episodes, blackouts or seizure history.  Psychiatric: Patient denies any anxiety, depression or mood disorder.       Personal History     Past Medical History:   Diagnosis Date   • Callus N/a   • Difficulty walking N/a       History reviewed. No pertinent surgical history.    Family History: family history includes Heart disease in his brother and mother. Otherwise pertinent FHx was reviewed and not pertinent to current issue.  Patient's mother is alive at age 92 with coronary artery disease his father passed away from alcoholism.  He has multiple brothers with significant heart disease and 1 brother with CHF.  Bilateral lower extremity edema with    Social History:  reports that he has been smoking cigarettes. He has a 40.00 pack-year smoking history. He has never used  smokeless tobacco. He reports current drug use. Frequency: 4.00 times per week. Drug: Marijuana. He reports that he does not drink alcohol.  Patient is a  by trade he is  with 3 children who are healthy.    Home Medications:   None prior    Allergies:  No Known Allergies    Objective   Objective     Vitals:   Temp:  [97.2 °F (36.2 °C)-100.1 °F (37.8 °C)] 97.3 °F (36.3 °C)  Heart Rate:  [] 59  Resp:  [16-18] 18  BP: (145-184)/() 145/73    Physical Exam    General: Awake, alert, NAD   Eyes:  AGATA, EOMI, Sclera non-icteric   Neck: Supple, no LAD or carotid bruits present   Lungs: Clear to auscultation B   Heart: RRR   Abdomen: Soft, nontender, nondistended with positive bowel sounds   Ext: No clubbing cyanosis but with 2-3+ bilateral lower extremity edema edema.  B radial pulses palpable in bilateral dorsalis pedis and posterior tibial signals dopplerable.  Right distal great toe and fifth toe ulcerations tender to palpation.   Neuro: CN's II-XII grossly intact.  No focal or lateralizing deficits present currently.          Pertinent Lab Data:    CBC:      Lab 03/14/23  0010 03/13/23  1151   WBC 12.65* 13.10*   HEMOGLOBIN 12.4* 13.9   HEMATOCRIT 37.2* 41.2   PLATELETS 321 366   NEUTROS ABS 7.54* 8.99*   IMMATURE GRANS (ABS) 0.03 0.05   LYMPHS ABS 3.86* 2.95   MONOS ABS 0.77 0.77   EOS ABS 0.36 0.27   MCV 94.2 94.1        CMP:      Lab 03/14/23  0010 03/13/23  1151   SODIUM 141 145   POTASSIUM 3.8 4.2   CHLORIDE 106 106   CO2 26.3 27.9   ANION GAP 8.7 11.1   BUN 12 10   CREATININE 0.65* 0.62*   EGFR 108.5 110.1   GLUCOSE 139* 96   CALCIUM 8.9 9.6   MAGNESIUM 1.8 1.8   TOTAL PROTEIN  --  7.2   ALBUMIN  --  4.5   GLOBULIN  --  2.7   ALT (SGPT)  --  20   AST (SGOT)  --  17   BILIRUBIN  --  0.3   ALK PHOS  --  108        CT Angio Abdominal Aorta Bilateral Iliofem Runoff    Result Date: 3/13/2023    1. Multifocal atherosclerotic disease as described above, most prominent involving areas  of occlusion of the bilateral external iliac arteries as described above. 2. No acute process identified within abdomen/pelvis. 3. Cholelithiasis.     LAYLA MOELLER MD       Electronically Signed and Approved By: LAYLA MOELLER MD on 3/13/2023 at 18:11                 Assessment & Plan   Assessment / Plan     Active Hospital Problems:  Active Hospital Problems    Diagnosis    • **Arterial occlusion        Assessment/plan:   Patient is a pleasant 58-year-old  male smoker with 3-month history of significant edema to his bilateral lower extremities as well as pain and nonhealing ulcerations to the right great and fifth toes after stubbing them in the bathroom.  2.  Patient presented to the emergency department and was found on CT angiogram to have multifocal atherosclerotic disease with bilateral external iliac artery occlusions and right common iliac artery disease.  The remainder of his outflow is fortunately relatively preserved.  3.  Patient has not seen a doctor in some time and does not have a primary care physician however he has been without any significant medical problems to his knowledge up until these changes 3 months ago.  He was just getting established with a PCP next month.  4.  Given the extent of his bilateral external iliac artery occlusions while I do think an endovascular option could be attempted I think that the patient likely requires transfer to a tertiary care center such as [facility] for higher level of care as he could also very likely require an aortobifem bypass given his age and right foot ulcerations.  Current OR availability here also was quite limited at this time.  5.  The patient is currently on a heparin drip however I believe that his peripheral arterial disease is chronic and not acute as such this could be discontinued currently.  6.  Fortunately he is relatively stable with normal creatinine but is INR is also currently elevated at 2.75 on lab obtained yesterday.  We  will repeat this lab today and as his disease is chronic I will hold his heparin drip.  7.  I discussed potential transfer to [facility] with Dr. Abdalla per the primary team.  Should there be any questions or concerns regarding the need for transfer please not hesitate to contact me.  I have also discussed the situation with patient and his wife at bedside who voiced understanding and willingness to proceed.      Thank you for the opportunity see this patient in consultation.  Electronically signed by Tom Bravo MD, 03/14/23, 9:15 AM EDT.

## 2023-03-14 NOTE — THERAPY EVALUATION
Acute Care - Physical Therapy Initial Evaluation   Sarah     Patient Name: Marco Antonio Davison  : 1965  MRN: [id number]  Today's Date: 3/14/2023      Visit Dx: Admit date: 3/13/2023     Referring Physician: Venancio Ward MD     Surgery Date:* No surgery found *          ICD-10-CM ICD-9-CM   1. Arterial occlusion  I70.90 444.9   2. Difficulty walking  R26.2 719.7     Patient Active Problem List   Diagnosis   • Arterial occlusion     Past Medical History:   Diagnosis Date   • Callus N/a   • Difficulty walking N/a     History reviewed. No pertinent surgical history.  PT Assessment (last 12 hours)     PT Evaluation and Treatment     Row Name 23 1111          Physical Therapy Time and Intention    Subjective Information no complaints  -DP     Document Type evaluation  -DP     Mode of Treatment individual therapy;physical therapy  -DP     Patient Effort good  -DP     Symptoms Noted During/After Treatment none  -DP     Row Name 23 1111          General Information    Patient Profile Reviewed yes  -DP     Patient Observations alert;cooperative;agree to therapy  -DP     Prior Level of Function independent:;gait;transfer;bed mobility;ADL's  -DP     Equipment Currently Used at Home none  -DP     Existing Precautions/Restrictions no known precautions/restrictions  -DP     Barriers to Rehab none identified  -DP     Row Name 23 1111          Living Environment    Current Living Arrangements home  -DP     Home Accessibility stairs to enter home  -DP     People in Home spouse  -DP     Primary Care Provided by self  -DP     Row Name 23 1111          Home Main Entrance    Number of Stairs, Main Entrance three  -DP     Row Name 23 1111          Home Use of Assistive/Adaptive Equipment    Equipment Currently Used at Home none  -DP     Row Name 23 1111          Cognition    Affect/Mental Status (Cognition) WFL  -DP     Orientation Status (Cognition) oriented x 4  -DP     Follows Commands  (Cognition) WFL  -DP     Cognitive Function WFL  -DP     Row Name 03/14/23 1111          Range of Motion (ROM)    Range of Motion bilateral lower extremities;ROM is WFL  -DP     Row Name 03/14/23 1111          Strength (Manual Muscle Testing)    Strength (Manual Muscle Testing) bilateral lower extremities;other (see comments)  4+/5  -DP     Row Name 03/14/23 1111          Bed Mobility    Bed Mobility other (see comments)  not tested, sitting on edge of bed upon arrival.  -DP     Supine-Sit [place] (Bed Mobility) not tested  -DP     Sit-Supine [place] (Bed Mobility) not tested  -DP     Row Name 03/14/23 1111          Transfers    Transfers sit-stand transfer;stand-sit transfer  -DP     Row Name 03/14/23 1111          Sit-Stand Transfer    Sit-Stand [place] (Transfers) standby assist  -DP     Assistive Device (Sit-Stand Transfers) other (see comments)  no assistive device  -DP     Row Name 03/14/23 1111          Stand-Sit Transfer    Stand-Sit [place] (Transfers) standby assist  -DP     Row Name 03/14/23 1111          Gait/Stairs (Locomotion)    Gait/Stairs Locomotion gait/ambulation independence  -DP     [place] Level (Gait) standby assist  -DP     Assistive Device (Gait) other (see comments)  no assistive device  -DP     Distance in Feet (Gait) 40  -DP     Deviations/Abnormal Patterns (Gait) gait speed decreased  -DP     Row Name 03/14/23 1111          Balance    Balance Assessment standing dynamic balance  -DP     Dynamic Standing Balance standby assist  -DP     Position/Device Used, Standing Balance unsupported  -DP     Row Name             Wound Right anterior great toe Traumatic    Wound - Properties Group Side: Right  -BRIAN Orientation: anterior  -BRIAN Location: great toe  -BRIAN Primary Wound Type: Traumatic  -BRIAN    Retired Wound - Properties Group Side: Right  -BRIAN Orientation: anterior  -BRIAN Location: great toe  -BRIAN Primary Wound Type: Traumatic  -BRIAN    Retired Wound - Properties Group Side:  Right  -BRIAN Location: great toe  -BRIAN Primary Wound Type: Traumatic  -BRIAN    Row Name 03/14/23 1111          Plan of Care Review    Plan of Care Reviewed With patient  -DP     Outcome Evaluation Patient shows minimal deficits with mobility, transfers and gait. He does not need physical therapy services at this time while in the hospital. Patient is okay to go home once discharged from hospital.  -DP     Row Name 03/14/23 1111          Therapy Assessment/Plan (PT)    Rehab Potential (PT) good, to achieve stated therapy goals  -DP     Criteria for Skilled Interventions Met (PT) no;no problems identified which require skilled intervention  -DP     Therapy Frequency (PT) evaluation only  -DP     Row Name 03/14/23 1111          PT Evaluation Complexity    History, PT Evaluation Complexity 1-2 personal factors and/or comorbidities  -DP     Examination of Body Systems (PT Eval Complexity) total of 4 or more elements  -DP     Clinical Presentation (PT Evaluation Complexity) stable  -DP     Clinical Decision Making (PT Evaluation Complexity) low complexity  -DP     Overall Complexity (PT Evaluation Complexity) low complexity  -DP           User Key  (r) = Recorded By, (t) = Taken By, (c) = Cosigned By    Initials Name Provider Type    Robert Meehan, RN Registered Nurse    Shadi Mixon, PT Physical Therapist                  PT Recommendation and Plan  Anticipated Discharge Disposition (PT): home  Therapy Frequency (PT): evaluation only  Plan of Care Reviewed With: patient  Outcome Evaluation: Patient shows minimal deficits with mobility, transfers and gait. He does not need physical therapy services at this time while in the hospital. Patient is okay to go home once discharged from hospital.   Outcome Measures     Row Name 03/14/23 1115             How much help from another person do you currently need...    Turning from your back to your side while in flat bed without using bedrails? 4  -DP      Moving from lying  on back to sitting on the side of a flat bed without bedrails? 4  -DP      Moving to and from a bed to a chair (including a wheelchair)? 4  -DP      Standing up from a chair using your arms (e.g., wheelchair, bedside chair)? 4  -DP      Climbing 3-5 steps with a railing? 2  -DP      To walk in hospital room? 3  -DP      AM-PAC 6 Clicks Score (PT) 21  -DP         Functional Assessment    Outcome Measure Options AM-PAC 6 Clicks Basic Mobility (PT)  -DP            User Key  (r) = Recorded By, (t) = Taken By, (c) = Cosigned By    Initials Name Provider Type    Shadi Mixon, PT Physical Therapist                 Time Calculation:    PT Charges     Row Name 03/14/23 1116             Time Calculation    PT Received On 03/14/23  -DP         Untimed Charges    PT Eval/Re-eval Minutes 40  -DP         Total Minutes    Untimed Charges Total Minutes 40  -DP       Total Minutes 40  -DP            User Key  (r) = Recorded By, (t) = Taken By, (c) = Cosigned By    Initials Name Provider Type    Shadi Mixon, PT Physical Therapist              Therapy Charges for Today     Code Description Service Date Service Provider Modifiers Qty    [id number] HC PT EVAL LOW COMPLEXITY 3 3/14/2023 Shadi Aldana, PT GP 1          PT G-Codes  Outcome Measure Options: AM-PAC 6 Clicks Basic Mobility (PT)  AM-PAC 6 Clicks Score (PT): 21    Shadi Aldana, PT  3/14/2023

## 2023-03-14 NOTE — DISCHARGE SUMMARY
[facility]        HOSPITALIST  DISCHARGE SUMMARY    Patient Name: Marco Antonio Davison  : 1965  MRN: [id number]    Date of Admission: 3/13/2023  Date of Discharge:  3/14/2023  Primary Care Physician: Provider, No Known    Consults     Date and Time Order Name Status Description    3/13/2023  6:29 PM Inpatient Vascular Surgery Consult Completed     3/13/2023  5:47 PM Inpatient Hospitalist Consult      3/13/2023  2:45 PM Inpatient Vascular Surgery Consult Completed           Active and Resolved Hospital Problems:  Active Hospital Problems    Diagnosis POA   • **Arterial occlusion [I70.90] Yes      Resolved Hospital Problems   No resolved problems to display.       Hospital Course     Hospital Course:  Marco Antonio Davison is a 58 y.o. male smoker who presents to the hospital after 3 months of bilateral lower extremity edema and ulcerations to his right great and fifth toes after stubbing them in the bathroom back in December.  These have failed to heal and the patient was scheduled to see a podiatrist as well as obtain a new PCP here in the next few weeks.  However the pain progressively has worsened to his bilateral calves and the swelling has not resolved.  He states the pain is 9 out of 10 to his feet at its worst sometimes even at rest and he is unable to ambulate any significant distance.  Given these issues he presented to the [facility] for evaluation and underwent a noninvasive MARCELINA study initially showing right to right MARCELINA to be 0.2 and left MARCELINA's to be 0.43.       A CTA was obtained of the aorta with bilateral iliac femoral runoff and this shows extensive multifocal atherosclerotic disease but most notably with bilateral external iliac artery occlusive disease with his internal iliac arteries patent left seems to be more prominent than right.  He reconstitutes his bilateral common femoral arteries in his distal outflow to both legs does seem to be relatively preserved.  This is all  consistent with chronic but significant peripheral arterial disease.                              Interval follow-up;  Low-grade temperature.  Remains on room air.  Off heparin drip as per vascular.  Continues to have pain in lower extremity right more than left.  Patient evaluated by vascular surgery.  Recommendation is transfer to higher level of care as may need femoropopliteal bypass.  Surgery schedule here is full for rest of the week.  Case discussed with vascular surgery with Dr. Hayden Martinez at [place].  Patient has been accepted by hospitalist Dr. Grubbs for transfer.        DISCHARGE Follow Up Recommendations for labs and diagnostics: PCP and vascular surgery      Day of Discharge     Vital Signs:  Temp:  [97.2 °F (36.2 °C)-100.1 °F (37.8 °C)] 97.6 °F (36.4 °C)  Heart Rate:  [] 71  Resp:  [16-18] 18  BP: (145-184)/() 147/67    Physical Exam:   Constitutional:       General: He is not in acute distress.     Appearance: Normal appearance. He is not toxic-appearing.   HENT:      Head: Normocephalic and atraumatic.   Eyes:      General: Lids are normal.      Extraocular Movements: Extraocular movements intact.      Conjunctiva/sclera: Conjunctivae normal.      Pupils: Pupils are equal, round, and reactive to light.   Cardiovascular:      Rate and Rhythm: Normal rate and regular rhythm.      Pulses: Normal pulses.  Dopplerable distal lower extremity pulses     Heart sounds: Normal heart sounds.   Pulmonary:      Effort: Pulmonary effort is normal. No respiratory distress.      Breath sounds: Normal breath sounds. No wheezing or rhonchi.   Abdominal:      General: Abdomen is flat.      Palpations: Abdomen is soft.      Tenderness: There is no abdominal tenderness. There is no guarding or rebound.   Musculoskeletal:         General: Normal range of motion.      Cervical back: Normal range of motion and neck supple.      Right lower leg: No edema.      Left lower leg: No edema.   Feet:      Comments:  Bilateral tense pedal edema   Skin:     General: Skin is warm and dry.  Developing green tip of right big toe and some on right fifth toe  Neurological:      Mental Status: He is alert and oriented to person, place, and time. Mental status is at baseline.   Psychiatric:         Mood and Affect: Mood normal.        Discharge Details        Discharge Medications      New Medications      Instructions Start Date   aspirin  MG tablet   325 mg, Oral, Daily   Start Date: March 15, 2023     atorvastatin 20 MG tablet  Commonly known as: LIPITOR   20 mg, Oral, Nightly      Enoxaparin Sodium 40 MG/0.4ML solution prefilled syringe syringe  Commonly known as: LOVENOX   40 mg, Subcutaneous, Every 24 Hours      famotidine 40 MG tablet  Commonly known as: PEPCID   40 mg, Oral, Daily   Start Date: March 15, 2023     nicotine 21 MG/24HR patch  Commonly known as: NICODERM CQ   1 patch, Transdermal, Every 24 Hours             No Known Allergies    Discharge Disposition:  Short Term Hospital (DC - External)    Diet:  Diet Instructions     Diet: Cardiac Diets; No Salt Packet; Texture: Regular Texture (IDDSI 7); Fluid Consistency: Thin (IDDSI 0)      Discharge Diet: Cardiac Diets    Cardiac Diet: No Salt Packet    Texture: Regular Texture (IDDSI 7)    Fluid Consistency: Thin (IDDSI 0)          Discharge Activity:   Activity Instructions     Activity as Tolerated            CODE STATUS:  Code Status and Medical Interventions:   Ordered at: 03/13/23 1829     Code Status (Patient has no pulse and is not breathing):    CPR (Attempt to Resuscitate)     Medical Interventions (Patient has pulse or is breathing):    Full Support         Future Appointments   Date Time Provider Department Center   3/24/2023  8:00 AM Sebastián Hernandez DPM [facility]   3/30/2023  3:30 PM LEONARDO HVS [facility] ROOM [address] [facility]       Additional Instructions for the Follow-ups that You Need to Schedule     Discharge Follow-up with PCP   As directed        Currently Documented PCP:    Provider, No Known    PCP Phone Number:    None     Follow Up Details: 2 weeks         Discharge Follow-up with Specialty: Vascular surgery   As directed      Specialty: Vascular surgery               Pertinent  and/or Most Recent Results     PROCEDURES:   * Cannot find OR case *     LAB RESULTS:      Lab 03/14/23  1215 03/14/23  0950 03/14/23  0539 03/14/23  0010 03/13/23  2031 03/13/23  1828 03/13/23  1151   WBC  --   --   --  12.65*  --   --  13.10*   HEMOGLOBIN  --   --   --  12.4*  --   --  13.9   HEMATOCRIT  --   --   --  37.2*  --   --  41.2   PLATELETS  --   --   --  321  --   --  366   NEUTROS ABS  --   --   --  7.54*  --   --  8.99*   IMMATURE GRANS (ABS)  --   --   --  0.03  --   --  0.05   LYMPHS ABS  --   --   --  3.86*  --   --  2.95   MONOS ABS  --   --   --  0.77  --   --  0.77   EOS ABS  --   --   --  0.36  --   --  0.27   MCV  --   --   --  94.2  --   --  94.1   LACTATE  --   --   --   --   --   --  0.9   PROTIME  --  13.3  --   --   --  28.6*  --    APTT 31.8* 82.2 112.4* 88.8 133.6*  --   --          Lab 03/14/23  0010 03/13/23  1151   SODIUM 141 145   POTASSIUM 3.8 4.2   CHLORIDE 106 106   CO2 26.3 27.9   ANION GAP 8.7 11.1   BUN 12 10   CREATININE 0.65* 0.62*   EGFR 108.5 110.1   GLUCOSE 139* 96   CALCIUM 8.9 9.6   MAGNESIUM 1.8 1.8   TSH  --  1.230         Lab 03/13/23  1151   TOTAL PROTEIN 7.2   ALBUMIN 4.5   GLOBULIN 2.7   ALT (SGPT) 20   AST (SGOT) 17   BILIRUBIN 0.3   ALK PHOS 108         Lab 03/14/23  0950 03/13/23  1828 03/13/23  1151   PROBNP  --   --  122.1   HSTROP T  --   --  13   PROTIME 13.3 28.6*  --    INR 1.00 2.75*  --                  Brief Urine Lab Results     None        Microbiology Results (last 10 days)     ** No results found for the last 240 hours. **          XR Foot 3+ View Right    Result Date: 2/18/2023  Impression:   1. Diffuse soft tissue swelling may represent dependent edema, posttraumatic changes or cellulitis. 2. Diffuse  osteopenia 3. No radiographic evidence of osteomyelitis.      Priyank Lopez M.D.       Electronically Signed and Approved By: Priyank Lopez M.D. on 2/18/2023 at 7:05             CT Angio Abdominal Aorta Bilateral Iliofem Runoff    Result Date: 3/13/2023  Impression:   1. Multifocal atherosclerotic disease as described above, most prominent involving areas of occlusion of the bilateral external iliac arteries as described above. 2. No acute process identified within abdomen/pelvis. 3. Cholelithiasis.     LAYLA MOELLER MD       Electronically Signed and Approved By: LAYLA MOELLER MD on 3/13/2023 at 18:11               Results for orders placed during the hospital encounter of 03/13/23    Doppler Arterial Multi Level Lower Extremity - Bilateral CAR    Interpretation Summary  •  Right Conclusion: The right MARCELINA is critically reduced. Waveforms are consistent with femoral disease, popliteal disease and tib-peroneal disease. Severe digital ischemia.  •  Left Conclusion: The left MARCELINA is severely reduced. Waveforms are consistent with femoral disease. Severe digital ischemia.  •  Vascular surgical evaluation would be appropriate regarding these findings      Results for orders placed during the hospital encounter of 03/13/23    Doppler Arterial Multi Level Lower Extremity - Bilateral CAR    Interpretation Summary  •  Right Conclusion: The right MARCELINA is critically reduced. Waveforms are consistent with femoral disease, popliteal disease and tib-peroneal disease. Severe digital ischemia.  •  Left Conclusion: The left MARCELINA is severely reduced. Waveforms are consistent with femoral disease. Severe digital ischemia.  •  Vascular surgical evaluation would be appropriate regarding these findings          Imaging Results (All)     Procedure Component Value Units Date/Time    CT Angio Abdominal Aorta Bilateral Iliofem Runoff [[id number]] Collected: 03/13/23 1811     Updated: 03/13/23 1814    Narrative:      PROCEDURE: CT ANGIO  ABDOMINAL AORTA BILAT ILIOFEM RUNOFF W WO CONTRAST     COMPARISON: None     INDICATIONS: evaluate arterial insufficiency,patient has been having rt foot swelling for the past   3 months w/pain     TECHNIQUE: After obtaining the patient's consent, CT images of the abdomen, pelvis, and lower   extremities were obtained without and with non-ionic intravenous contrast material. Multi-planar   reformatted/3-D images were created to optimize visualization of vascular anatomy.       PROTOCOL:   Standard imaging protocol performed      RADIATION:   DLP: 521.6mGy*cm    Automated exposure control was utilized to minimize radiation dose.   CONTRAST: 100cc Isovue 370 I.V.     FINDINGS:   Vascular:     The abdominal aorta is widely patent and normal in caliber with moderate plaque distally.  The   celiac trunk is widely patent.  The superior mesenteric artery is widely patent.  The bilateral   renal arteries are widely patent with mild plaque proximally.  The inferior mesenteric artery is   widely patent.  There is severe stenosis along the right common iliac artery but it is patent   throughout.  The left common iliac artery is widely patent with moderate plaque proximally.  The   bilateral internal iliac arteries are patent with moderate plaque.  There is severe atherosclerotic   disease involving the right external iliac artery with areas of occlusion proximally and distally.    There is occlusion of most of the left external iliac artery.     Right lower extremity:  The common femoral artery is reconstituted via collateral flow.  The   profunda femoral artery is widely patent.  The superficial femoral artery is patent throughout with   severe stenosis proximally.  The popliteal artery is widely patent with mild plaque.  The   trifurcation arteries of the leg are patent.  The dorsalis pedis artery is patent.     Left lower extremity:  The common femoral artery is reconstituted via collateral flow.  The   profunda femoral  artery is widely patent.  The superficial femoral artery is widely patent.  The   popliteal artery is widely patent with moderate plaque proximally.  The trifurcation arteries of   the leg are patent.  The dorsalis pedis artery is patent.     Nonvascular:     The lung bases are clear.  The liver, adrenal glands, kidneys, spleen, and pancreas are   unremarkable.  There is a stone in the gallbladder.  The stomach appears normal.  The small bowel   appears normal in caliber and configuration.  The colon appears normal.  The appendix is not well   visualized.  There is no ascites or loculated collection.  No abnormally enlarged lymph nodes are   identified.  The rectum, prostate, and urinary bladder are unremarkable.  No aggressive osseous   lesions are identified.       Impression:         1. Multifocal atherosclerotic disease as described above, most prominent involving areas of   occlusion of the bilateral external iliac arteries as described above.  2. No acute process identified within abdomen/pelvis.  3. Cholelithiasis.            LAYLA MOELLER MD         Electronically Signed and Approved By: LAYLA MOELLER MD on 3/13/2023 at 18:11                            Labs Pending at Discharge:          Time spent on Discharge including face to face service: More than 30 minutes  Part of this note may be an electronic transcription/translation of spoken language to printed text using the Dragon Dictation System.     TElectronically signed by Venancio Ward MD, 03/14/23, 12:46 PM EDT.

## 2023-03-14 NOTE — PLAN OF CARE
Goal Outcome Evaluation:  Plan of Care Reviewed With: patient, spouse        Progress: no change  Outcome Evaluation: Patient presents with limitations in self-care, functional transfers, balance, and endurance. He would benefit from continued skilled occupational therapy services to maximize independence with ADLs/functional transfers. Home with assist recommended upon discharge from hospital.

## 2023-03-16 NOTE — PAYOR COMM NOTE
SUBJECT:     AMBULANCE TRANSPORTATION AUTHORIZATION REQUEST    PATIENT'S NAME:     [facility] MRN:     [id number]     DOS:     3/14/2023    INSURANCE MEMBER ID:     [id number]        SERVICE PROVIDER  Company: [facility]  NPI    [id number]   Tax ID  [id number]  Address[place]  [address] [place] [address]  Phone:      [phone]  Fax:    [phone]        REQUESTING FACILITY  Name:  [facility]   NPI:  [id number]  TID:  [id number]  [place]      [address] [place] [address]  Phone:             [phone]        CASE MANAGEMENT CONTACT INFORMATION  Name:     Flora Jerome             Phone:    [phone]  Fax:  [phone]        DIAGNOSIS AND HOSPITAL PROBLEMS    Problems Addressed this Visit        Other    * (Principal) Arterial occlusion - Primary   Other Visit Diagnoses     Difficulty walking        Decreased activities of daily living (ADL)          Diagnoses       Codes Comments    Arterial occlusion    -  Primary ICD-10-CM: I70.90  ICD-9-CM: 444.9     Difficulty walking     ICD-10-CM: R26.2  ICD-9-CM: 719.7     Decreased activities of daily living (ADL)     ICD-10-CM: Z78.9  ICD-9-CM: V49.89          Marco Antonio Davison (58 y.o. Male)     Date of Birth   1965    Social Security Number       Address   [address] [place], [place] [address]    Home Phone   [phone]    MRN   [id number]       Amish   Unknown    Marital Status                               Admission Date   3/13/23    Admission Type   Emergency    Admitting Provider   Venancio Ward MD    Attending Provider       Department, Room/Bed   [facility] 5TH FLOOR SURGICAL TELEMETRY UNIT, 512/1       Discharge Date   3/14/2023    Discharge Disposition   [facility] (DC - External)    Discharge Destination                               Attending Provider: (none)   Allergies: No Known Allergies    Isolation: None   Infection: None   Code Status:  "Prior    Ht: 180.3 cm (71\")   Wt: 68 kg (150 lb)    Admission Cmt: None   Principal Problem: Arterial occlusion [I70.90]                 Active Insurance as of 3/13/2023     Primary Coverage     Payor Plan Insurance Group Employer/Plan Group    [facility] COMMUNITY PLAN [facility] COMMUNITY PLAN [facility]     Payor Plan Address Payor Plan Phone Number Payor Plan Fax Number Effective Dates    [address]   2023 - None Entered    [facility] [id number]       Subscriber Name Subscriber Birth Date Member ID       MARCO ANTONIO RICE 1965 [id number]                 Emergency Contacts      (Rel.) Home Phone Work Phone Mobile Phone    STEFANIE RICE (Spouse) [phone] -- --                               Discharge Summary      Venancio Ward MD at 23 1246                         [facility]  DISCHARGE SUMMARY    Patient Name: Marco Antonio Rice  : 1965  MRN: [id number]    Date of Admission: 3/13/2023  Date of Discharge:  3/14/2023  Primary Care Physician: Provider, No Known    Consults     Date and Time Order Name Status Description    3/13/2023  6:29 PM Inpatient Vascular Surgery Consult Completed     3/13/2023  5:47 PM Inpatient Hospitalist Consult      3/13/2023  2:45 PM Inpatient Vascular Surgery Consult Completed           Active and Resolved Hospital Problems:  Active Hospital Problems    Diagnosis POA   • **Arterial occlusion [I70.90] Yes      Resolved Hospital Problems   No resolved problems to display.       Hospital Course     Hospital Course:  Marco Antonio Rice is a 58 y.o. male smoker who presents to the hospital after 3 months of bilateral lower extremity edema and ulcerations to his right great and fifth toes after stubbing them in the bathroom back in December.  These have failed to heal and the patient was scheduled to see a podiatrist as well as obtain a new PCP here in the next few weeks.  However the pain progressively has worsened to his bilateral calves and the swelling " has not resolved.  He states the pain is 9 out of 10 to his feet at its worst sometimes even at rest and he is unable to ambulate any significant distance.  Given these issues he presented to the [facility] for evaluation and underwent a noninvasive MARCELINA study initially showing right to right MARCELINA to be 0.2 and left MARCELINA's to be 0.43.       A CTA was obtained of the aorta with bilateral iliac femoral runoff and this shows extensive multifocal atherosclerotic disease but most notably with bilateral external iliac artery occlusive disease with his internal iliac arteries patent left seems to be more prominent than right.  He reconstitutes his bilateral common femoral arteries in his distal outflow to both legs does seem to be relatively preserved.  This is all consistent with chronic but significant peripheral arterial disease.                              Interval follow-up;  Low-grade temperature.  Remains on room air.  Off heparin drip as per vascular.  Continues to have pain in lower extremity right more than left.  Patient evaluated by vascular surgery.  Recommendation is transfer to higher level of care as may need femoropopliteal bypass.  Surgery schedule here is full for rest of the week.  Case discussed with vascular surgery with Dr. Hayden Martinez at [place].  Patient has been accepted by hospitalist Dr. Grubbs for transfer.        DISCHARGE Follow Up Recommendations for labs and diagnostics: PCP and vascular surgery      Day of Discharge     Vital Signs:  Temp:  [97.2 °F (36.2 °C)-100.1 °F (37.8 °C)] 97.6 °F (36.4 °C)  Heart Rate:  [] 71  Resp:  [16-18] 18  BP: (145-184)/() 147/67    Physical Exam:   Constitutional:       General: He is not in acute distress.     Appearance: Normal appearance. He is not toxic-appearing.   HENT:      Head: Normocephalic and atraumatic.   Eyes:      General: Lids are normal.      Extraocular Movements: Extraocular movements intact.      Conjunctiva/sclera:  Conjunctivae normal.      Pupils: Pupils are equal, round, and reactive to light.   Cardiovascular:      Rate and Rhythm: Normal rate and regular rhythm.      Pulses: Normal pulses.  Dopplerable distal lower extremity pulses     Heart sounds: Normal heart sounds.   Pulmonary:      Effort: Pulmonary effort is normal. No respiratory distress.      Breath sounds: Normal breath sounds. No wheezing or rhonchi.   Abdominal:      General: Abdomen is flat.      Palpations: Abdomen is soft.      Tenderness: There is no abdominal tenderness. There is no guarding or rebound.   Musculoskeletal:         General: Normal range of motion.      Cervical back: Normal range of motion and neck supple.      Right lower leg: No edema.      Left lower leg: No edema.   Feet:      Comments: Bilateral tense pedal edema   Skin:     General: Skin is warm and dry.  Developing green tip of right big toe and some on right fifth toe  Neurological:      Mental Status: He is alert and oriented to person, place, and time. Mental status is at baseline.   Psychiatric:         Mood and Affect: Mood normal.        Discharge Details        Discharge Medications      New Medications      Instructions Start Date   aspirin  MG tablet   325 mg, Oral, Daily   Start Date: March 15, 2023     atorvastatin 20 MG tablet  Commonly known as: LIPITOR   20 mg, Oral, Nightly      Enoxaparin Sodium 40 MG/0.4ML solution prefilled syringe syringe  Commonly known as: LOVENOX   40 mg, Subcutaneous, Every 24 Hours      famotidine 40 MG tablet  Commonly known as: PEPCID   40 mg, Oral, Daily   Start Date: March 15, 2023     nicotine 21 MG/24HR patch  Commonly known as: NICODERM CQ   1 patch, Transdermal, Every 24 Hours             No Known Allergies    Discharge Disposition:  [facility] (DC - External)    Diet:  Diet Instructions     Diet: Cardiac Diets; No Salt Packet; Texture: Regular Texture (IDDSI 7); Fluid Consistency: Thin (IDDSI 0)      Discharge Diet:  Cardiac Diets    Cardiac Diet: No Salt Packet    Texture: Regular Texture (IDDSI 7)    Fluid Consistency: Thin (IDDSI 0)          Discharge Activity:   Activity Instructions     Activity as Tolerated            CODE STATUS:  Code Status and Medical Interventions:   Ordered at: 03/13/23 1829     Code Status (Patient has no pulse and is not breathing):    CPR (Attempt to Resuscitate)     Medical Interventions (Patient has pulse or is breathing):    Full Support         Future Appointments   Date Time Provider Department Center   3/24/2023  8:00 AM Sebastián Hernandez DPM [facility] POD [facility]   3/30/2023  3:30 PM LEONARD[facility]OM [facility]       Additional Instructions for the Follow-ups that You Need to Schedule     Discharge Follow-up with PCP   As directed       Currently Documented PCP:    Provider, No Known    PCP Phone Number:    None     Follow Up Details: 2 weeks         Discharge Follow-up with Specialty: Vascular surgery   As directed      Specialty: Vascular surgery               Pertinent  and/or Most Recent Results     PROCEDURES:   * Cannot find OR case *     LAB RESULTS:      Lab 03/14/23  1215 03/14/23  0950 03/14/23  0539 03/14/23  0010 03/13/23 2031 03/13/23  1828 03/13/23  1151   WBC  --   --   --  12.65*  --   --  13.10*   HEMOGLOBIN  --   --   --  12.4*  --   --  13.9   HEMATOCRIT  --   --   --  37.2*  --   --  41.2   PLATELETS  --   --   --  321  --   --  366   NEUTROS ABS  --   --   --  7.54*  --   --  8.99*   IMMATURE GRANS (ABS)  --   --   --  0.03  --   --  0.05   LYMPHS ABS  --   --   --  3.86*  --   --  2.95   MONOS ABS  --   --   --  0.77  --   --  0.77   EOS ABS  --   --   --  0.36  --   --  0.27   MCV  --   --   --  94.2  --   --  94.1   LACTATE  --   --   --   --   --   --  0.9   PROTIME  --  13.3  --   --   --  28.6*  --    APTT 31.8* 82.2 112.4* 88.8 133.6*  --   --          Lab 03/14/23  0010 03/13/23  1151   SODIUM 141 145   POTASSIUM 3.8 4.2   CHLORIDE 106 106   CO2 26.3 27.9    ANION GAP 8.7 11.1   BUN 12 10   CREATININE 0.65* 0.62*   EGFR 108.5 110.1   GLUCOSE 139* 96   CALCIUM 8.9 9.6   MAGNESIUM 1.8 1.8   TSH  --  1.230         Lab 03/13/23  1151   TOTAL PROTEIN 7.2   ALBUMIN 4.5   GLOBULIN 2.7   ALT (SGPT) 20   AST (SGOT) 17   BILIRUBIN 0.3   ALK PHOS 108         Lab 03/14/23  0950 03/13/23  1828 03/13/23  1151   PROBNP  --   --  122.1   HSTROP T  --   --  13   PROTIME 13.3 28.6*  --    INR 1.00 2.75*  --                  Brief Urine Lab Results     None        Microbiology Results (last 10 days)     ** No results found for the last 240 hours. **          XR Foot 3+ View Right    Result Date: 2/18/2023  Impression:   1. Diffuse soft tissue swelling may represent dependent edema, posttraumatic changes or cellulitis. 2. Diffuse osteopenia 3. No radiographic evidence of osteomyelitis.      Priyank Lopez M.D.       Electronically Signed and Approved By: Priyank Lopez M.D. on 2/18/2023 at 7:05             CT Angio Abdominal Aorta Bilateral Iliofem Runoff    Result Date: 3/13/2023  Impression:   1. Multifocal atherosclerotic disease as described above, most prominent involving areas of occlusion of the bilateral external iliac arteries as described above. 2. No acute process identified within abdomen/pelvis. 3. Cholelithiasis.     LAYLA MOELLER MD       Electronically Signed and Approved By: LAYLA MOELLER MD on 3/13/2023 at 18:11               Results for orders placed during the hospital encounter of 03/13/23    Doppler Arterial Multi Level Lower Extremity - Bilateral CAR    Interpretation Summary  •  Right Conclusion: The right MARCELINA is critically reduced. Waveforms are consistent with femoral disease, popliteal disease and tib-peroneal disease. Severe digital ischemia.  •  Left Conclusion: The left MARCELINA is severely reduced. Waveforms are consistent with femoral disease. Severe digital ischemia.  •  Vascular surgical evaluation would be appropriate regarding these  findings      Results for orders placed during the hospital encounter of 03/13/23    Doppler Arterial Multi Level Lower Extremity - Bilateral CAR    Interpretation Summary  •  Right Conclusion: The right MARCELINA is critically reduced. Waveforms are consistent with femoral disease, popliteal disease and tib-peroneal disease. Severe digital ischemia.  •  Left Conclusion: The left MARCELINA is severely reduced. Waveforms are consistent with femoral disease. Severe digital ischemia.  •  Vascular surgical evaluation would be appropriate regarding these findings          Imaging Results (All)     Procedure Component Value Units Date/Time    CT Angio Abdominal Aorta Bilateral Iliofem Runoff [[id number]] Collected: 03/13/23 1811     Updated: 03/13/23 1814    Narrative:      PROCEDURE: CT ANGIO ABDOMINAL AORTA BILAT ILIOFEM RUNOFF W WO CONTRAST     COMPARISON: None     INDICATIONS: evaluate arterial insufficiency,patient has been having rt foot swelling for the past   3 months w/pain     TECHNIQUE: After obtaining the patient's consent, CT images of the abdomen, pelvis, and lower   extremities were obtained without and with non-ionic intravenous contrast material. Multi-planar   reformatted/3-D images were created to optimize visualization of vascular anatomy.       PROTOCOL:   Standard imaging protocol performed      RADIATION:   DLP: 521.6mGy*cm    Automated exposure control was utilized to minimize radiation dose.   CONTRAST: 100cc Isovue 370 I.V.     FINDINGS:   Vascular:     The abdominal aorta is widely patent and normal in caliber with moderate plaque distally.  The   celiac trunk is widely patent.  The superior mesenteric artery is widely patent.  The bilateral   renal arteries are widely patent with mild plaque proximally.  The inferior mesenteric artery is   widely patent.  There is severe stenosis along the right common iliac artery but it is patent   throughout.  The left common iliac artery is widely patent with moderate  plaque proximally.  The   bilateral internal iliac arteries are patent with moderate plaque.  There is severe atherosclerotic   disease involving the right external iliac artery with areas of occlusion proximally and distally.    There is occlusion of most of the left external iliac artery.     Right lower extremity:  The common femoral artery is reconstituted via collateral flow.  The   profunda femoral artery is widely patent.  The superficial femoral artery is patent throughout with   severe stenosis proximally.  The popliteal artery is widely patent with mild plaque.  The   trifurcation arteries of the leg are patent.  The dorsalis pedis artery is patent.     Left lower extremity:  The common femoral artery is reconstituted via collateral flow.  The   profunda femoral artery is widely patent.  The superficial femoral artery is widely patent.  The   popliteal artery is widely patent with moderate plaque proximally.  The trifurcation arteries of   the leg are patent.  The dorsalis pedis artery is patent.     Nonvascular:     The lung bases are clear.  The liver, adrenal glands, kidneys, spleen, and pancreas are   unremarkable.  There is a stone in the gallbladder.  The stomach appears normal.  The small bowel   appears normal in caliber and configuration.  The colon appears normal.  The appendix is not well   visualized.  There is no ascites or loculated collection.  No abnormally enlarged lymph nodes are   identified.  The rectum, prostate, and urinary bladder are unremarkable.  No aggressive osseous   lesions are identified.       Impression:         1. Multifocal atherosclerotic disease as described above, most prominent involving areas of   occlusion of the bilateral external iliac arteries as described above.  2. No acute process identified within abdomen/pelvis.  3. Cholelithiasis.            LAYLA MOELLER MD         Electronically Signed and Approved By: LAYLA MOELLER MD on 3/13/2023 at 18:11                             Labs Pending at Discharge:          Time spent on Discharge including face to face service: More than 30 minutes  Part of this note may be an electronic transcription/translation of spoken language to printed text using the Dragon Dictation System.     TElectronically signed by Venancio Ward MD, 03/14/23, 12:46 PM EDT.      Electronically signed by Venancio Ward MD at 03/14/23 7986

## 2023-04-10 ENCOUNTER — OFFICE VISIT (OUTPATIENT)
Dept: PODIATRY | Facility: CLINIC | Age: 58
End: 2023-04-10
Payer: MEDICAID

## 2023-04-10 VITALS
DIASTOLIC BLOOD PRESSURE: 81 MMHG | OXYGEN SATURATION: 99 % | WEIGHT: 127 LBS | SYSTOLIC BLOOD PRESSURE: 148 MMHG | HEIGHT: 71 IN | BODY MASS INDEX: 17.78 KG/M2 | HEART RATE: 65 BPM | TEMPERATURE: 97.8 F

## 2023-04-10 DIAGNOSIS — I96 GANGRENE: ICD-10-CM

## 2023-04-10 DIAGNOSIS — I73.9 PERIPHERAL VASCULAR DISEASE: Primary | ICD-10-CM

## 2023-04-10 PROCEDURE — 1160F RVW MEDS BY RX/DR IN RCRD: CPT | Performed by: PODIATRIST

## 2023-04-10 PROCEDURE — 99213 OFFICE O/P EST LOW 20 MIN: CPT | Performed by: PODIATRIST

## 2023-04-10 PROCEDURE — 1159F MED LIST DOCD IN RCRD: CPT | Performed by: PODIATRIST

## 2023-04-10 RX ORDER — CLOPIDOGREL BISULFATE 75 MG/1
75 TABLET ORAL DAILY
COMMUNITY
Start: 2023-03-23

## 2023-04-10 RX ORDER — METOPROLOL TARTRATE 50 MG/1
TABLET, FILM COATED ORAL 2 TIMES DAILY
COMMUNITY
Start: 2023-03-23

## 2023-04-10 NOTE — PROGRESS NOTES
[facility] - PODIATRY    Today's Date: 04/10/23    Patient Name: Marco Antonio Davison  MRN: [id number]  CSN: [id number]  PCP: Provider, No Known,   Referring Provider: No ref. provider found    SUBJECTIVE     Chief Complaint   Patient presents with   • Right Foot - Follow-up     Nail avulsion f/u      HPI: Marco Antonio Davison, a 58 y.o.male, presents to clinic.    Patient is a 58-year-old male presenting with right big toe infection.  Patient states that he hit his toe on something several months ago.  It is slowly gotten swollen along with more tenderness.  Patient states it has been draining in the past.  He states he smokes over 1 pack a day.  He does not have a primary care doctor and has not seen a physician recently.  He has pain in his foot.    3/10/2023-patient is here for follow-up.  Patient states he still has pain when elevating his legs.  Needs to put them in a dependent position to relieve pain.  Still smoking.    Past Medical History:   Diagnosis Date   • Callus N/a   • Difficulty walking N/a   • Ingrown toenail      Past Surgical History:   Procedure Laterality Date   • VASCULAR SURGERY Bilateral 03/2023    axillobifemoral bypass     Family History   Problem Relation Age of Onset   • Heart disease Mother    • Heart disease Brother    • Cancer Neg Hx    • Diabetes Neg Hx    • Hypertension Neg Hx    • Thyroid disease Neg Hx      Social History     Socioeconomic History   • Marital status:    Tobacco Use   • Smoking status: Every Day     Packs/day: 1.00     Years: 40.00     Pack years: 40.00     Types: Cigarettes   • Smokeless tobacco: Never   Vaping Use   • Vaping Use: Never used   Substance and Sexual Activity   • Alcohol use: Never   • Drug use: Yes     Frequency: 4.0 times per week     Types: Marijuana     Comment: couple times a week   • Sexual activity: Not Currently     No Known Allergies  Current Outpatient Medications   Medication Sig Dispense Refill   • aspirin  MG tablet Take 1  tablet by mouth Daily for 30 days. 30 tablet 0   • atorvastatin (LIPITOR) 20 MG tablet Take 1 tablet by mouth Every Night for 30 days. 30 tablet 0   • clopidogrel (PLAVIX) 75 MG tablet Take 1 tablet by mouth Daily.     • metoprolol tartrate (LOPRESSOR) 50 MG tablet 2 (Two) Times a Day.     • Enoxaparin Sodium (LOVENOX) 40 MG/0.4ML solution prefilled syringe syringe Inject 0.4 mL under the skin into the appropriate area as directed Daily for 30 days. Indications: Prevention of Unwanted Clot in Veins (Patient not taking: Reported on 4/10/2023) 12 mL 0   • famotidine (PEPCID) 40 MG tablet Take 1 tablet by mouth Daily for 30 days. (Patient not taking: Reported on 4/10/2023) 30 tablet 0   • nicotine (NICODERM CQ) 21 MG/24HR patch Place 1 patch on the skin as directed by provider Daily for 30 days. (Patient not taking: Reported on 4/10/2023) 30 patch 0     No current facility-administered medications for this visit.     Review of Systems   Skin:        Gangrene right great toe   All other systems reviewed and are negative.      OBJECTIVE     Vitals:    04/10/23 0829   BP: 148/81   Pulse: 65   Temp: 97.8 °F (36.6 °C)   SpO2: 99%       WBC   Date Value Ref Range Status   03/23/2023 15.53 (H) 4.5 - 11.0 10*3/uL Final     RBC   Date Value Ref Range Status   03/23/2023 3.61 (L) 4.5 - 5.9 10*6/uL Final     Hemoglobin   Date Value Ref Range Status   03/23/2023 11.3 (L) 13.5 - 17.5 g/dL Final     Hematocrit   Date Value Ref Range Status   03/23/2023 34.8 (L) 41.0 - 53.0 % Final     MCV   Date Value Ref Range Status   03/23/2023 96.4 80.0 - 100.0 fL Final     MCH   Date Value Ref Range Status   03/23/2023 31.3 26.0 - 34.0 pg Final     MCHC   Date Value Ref Range Status   03/23/2023 32.5 31.0 - 37.0 g/dL Final     RDW   Date Value Ref Range Status   03/23/2023 13.0 12.0 - 16.8 % Final     RDW-SD   Date Value Ref Range Status   03/14/2023 46.5 37.0 - 54.0 fl Final     MPV   Date Value Ref Range Status   03/23/2023 10.6 8.4 - 12.4 fL  Final     Platelets   Date Value Ref Range Status   03/23/2023 284 140 - 440 10*3/uL Final     Neutrophil Rel %   Date Value Ref Range Status   03/23/2023 66.8 45 - 80 % Final     Lymphocyte Rel %   Date Value Ref Range Status   03/23/2023 20.7 15 - 50 % Final     Monocyte Rel %   Date Value Ref Range Status   03/23/2023 8.7 0 - 15 % Final     Eosinophil %   Date Value Ref Range Status   03/23/2023 2.8 0 - 7 % Final     Basophil Rel %   Date Value Ref Range Status   03/23/2023 0.5 0 - 2 % Final     Immature Grans %   Date Value Ref Range Status   03/23/2023 0.5 0.0 - 1.0 % Final     Neutrophils Absolute   Date Value Ref Range Status   03/23/2023 10.38 (H) 2.0 - 8.8 10*3/uL Final     Lymphocytes Absolute   Date Value Ref Range Status   03/23/2023 3.21 0.7 - 5.5 10*3/uL Final     Monocytes Absolute   Date Value Ref Range Status   03/23/2023 1.35 0.0 - 1.7 10*3/uL Final     Eosinophils Absolute   Date Value Ref Range Status   03/23/2023 0.44 0.0 - 0.8 10*3/uL Final     Basophils Absolute   Date Value Ref Range Status   03/23/2023 0.07 0.0 - 0.2 10*3/uL Final     Immature Grans, Absolute   Date Value Ref Range Status   03/23/2023 0.08 0.00 - 0.10 10*3/uL Final     nRBC   Date Value Ref Range Status   03/23/2023 0 0 /100(WBC) Final   03/14/2023 0.0 0.0 - 0.2 /100 WBC Final         Lab Results   Component Value Date    GLUCOSE 139 (H) 03/14/2023    BUN 12 03/14/2023    CREATININE 0.65 (L) 03/14/2023    BCR 18.5 03/14/2023    K 3.8 03/14/2023    CO2 26.3 03/14/2023    CALCIUM 8.9 03/14/2023    ALBUMIN 4.5 03/13/2023    AST 17 03/13/2023    ALT 20 03/13/2023       Patient seen in no apparent distress.      PHYSICAL EXAM:     Foot/Ankle Exam:       General:   Appearance: appears stated age and healthy    Orientation: AAOx3    Affect: appropriate    Gait: unimpaired    Shoe Gear:  Casual shoes    VASCULAR      Right Foot Vascularity   Normal vascular exam    Dorsalis pedis:  1+  Posterior tibial:  1+  Skin Temperature: warm     Edema Grading:  None, pitting and 3+  CFT:  < 3 seconds  Pedal Hair Growth:  Present  Varicosities: none       Left Foot Vascularity   Normal vascular exam    Dorsalis pedis:  1+  Posterior tibial:  1+  Skin Temperature: warm    Edema Grading:  None, 3+ and pitting  CFT:  < 3 seconds  Pedal Hair Growth:  Present  Varicosities: none        NEUROLOGIC     Right Foot Neurologic   Normal sensation    Light touch sensation:  Normal  Vibratory sensation:  Normal  Hot/Cold sensation: normal    Protective Sensation using D[place]ore-Wilver Monofilament:  10     Left Foot Neurologic   Normal sensation    Light touch sensation:  Normal  Vibratory sensation:  Normal  Hot/cold sensation: normal    Protective Sensation using Dushore-Wilver Monofilament:  10     MUSCLE STRENGTH     Right Foot Muscle Strength   Foot dorsiflexion:  4  Foot plantar flexion:  4  Foot inversion:  4  Foot eversion:  4     Left Foot Muscle Strength   Foot dorsiflexion:  4  Foot plantar flexion:  4  Foot inversion:  4  Foot eversion:  4     RANGE OF MOTION      Right Foot Range of Motion   Foot and ankle ROM within normal limits       Left Foot Range of Motion   Foot and ankle ROM within normal limits       DERMATOLOGIC     Right Foot Dermatologic   Skin: skin intact    Skin comment:  Erythema resolved.  No purulent drainage.  Dry necrotic eschar to distal aspect of right great toe.  Nails: normal       Left Foot Dermatologic   Skin: skin intact    Nails: normal        CT Angio Abdominal Aorta Bilateral Iliofem Runoff    Result Date: 3/13/2023  Narrative: PROCEDURE: CT ANGIO ABDOMINAL AORTA BILAT ILIOFEM RUNOFF W WO CONTRAST  COMPARISON: None  INDICATIONS: evaluate arterial insufficiency,patient has been having rt foot swelling for the past 3 months w/pain  TECHNIQUE: After obtaining the patient's consent, CT images of the abdomen, pelvis, and lower extremities were obtained without and with non-ionic intravenous contrast material. Multi-planar  reformatted/3-D images were created to optimize visualization of vascular anatomy.   PROTOCOL:   Standard imaging protocol performed    RADIATION:   DLP: 521.6mGy*cm   Automated exposure control was utilized to minimize radiation dose. CONTRAST: 100cc Isovue 370 I.V.  FINDINGS:  Vascular:  The abdominal aorta is widely patent and normal in caliber with moderate plaque distally.  The celiac trunk is widely patent.  The superior mesenteric artery is widely patent.  The bilateral renal arteries are widely patent with mild plaque proximally.  The inferior mesenteric artery is widely patent.  There is severe stenosis along the right common iliac artery but it is patent throughout.  The left common iliac artery is widely patent with moderate plaque proximally.  The bilateral internal iliac arteries are patent with moderate plaque.  There is severe atherosclerotic disease involving the right external iliac artery with areas of occlusion proximally and distally.  There is occlusion of most of the left external iliac artery.  Right lower extremity:  The common femoral artery is reconstituted via collateral flow.  The profunda femoral artery is widely patent.  The superficial femoral artery is patent throughout with severe stenosis proximally.  The popliteal artery is widely patent with mild plaque.  The trifurcation arteries of the leg are patent.  The dorsalis pedis artery is patent.  Left lower extremity:  The common femoral artery is reconstituted via collateral flow.  The profunda femoral artery is widely patent.  The superficial femoral artery is widely patent.  The popliteal artery is widely patent with moderate plaque proximally.  The trifurcation arteries of the leg are patent.  The dorsalis pedis artery is patent.  Nonvascular:  The lung bases are clear.  The liver, adrenal glands, kidneys, spleen, and pancreas are unremarkable.  There is a stone in the gallbladder.  The stomach appears normal.  The small bowel  appears normal in caliber and configuration.  The colon appears normal.  The appendix is not well visualized.  There is no ascites or loculated collection.  No abnormally enlarged lymph nodes are identified.  The rectum, prostate, and urinary bladder are unremarkable.  No aggressive osseous lesions are identified.      Impression:   1. Multifocal atherosclerotic disease as described above, most prominent involving areas of occlusion of the bilateral external iliac arteries as described above. 2. No acute process identified within abdomen/pelvis. 3. Cholelithiasis.     LAYLA MOELLER MD       Electronically Signed and Approved By: LAYLA MOELLER MD on 3/13/2023 at 18:11             Duplex Venous Lower Extremity - Bilateral CAR    Result Date: 3/13/2023  Narrative: •  Normal bilateral lower extremity venous duplex scan.     IMAGING SCANNED RESULT    Result Date: 2023  Narrative: This result has an attachment that is not available. Ordered by an unspecified provider.    CT Angio Abd Aorto-Iliofemoral Runoff    Result Date: 3/22/2023  Narrative: REVIEWING YOUR TEST RESULTS IN [facility] IS NOT A SUBSTITUTE FOR DISCUSSING THOSE RESULTS WITH YOUR HEALTH CARE PROVIDER. PLEASE CONTACT YOUR PROVIDER [facility]IA [facility] TO DISCUSS ANY QUESTIONS OR CONCERNS YOU MAY HAVE REGARDING THESE TEST RESULTS.  RADIOLOGY REPORT FACILITY:  [facility] UNIT/AGE/GENDER: N.4F  IN      AGE:58 Y          SEX:M PATIENT NAME/:  SLIME RICE H    1965 UNIT NUMBER:  [id number] ACCOUNT NUMBER:  [id number] ACCESSION NUMBER:  [id number] ACCESSION NUMBER: [id number] DATE: 3/22/2023 11:32 AM Examination: 1. CT abdomen without contrast 2. CTA abdomen and pelvis with runoff to the bilateral lower extremities. 3. Postprocessing 3-D arterial reconstructions of the abdomen and pelvis as well as the bilateral lower extremities, created at a separate workstation. CLINICAL HISTORY:  Arterial ischemia COMPARISON: MARCELINA on the  same day TECHNIQUE: Multiple contiguous axial images were obtained of the abdomen without contrast. Multiple contiguous axial images were then obtained with standard CTA aorta protocol from the level of the lower thoracic aorta through its distal branches in the lower extremities. Reconstruction images were also reviewed in multiple obliquities. 3-D reconstruction images were also reviewed. Oral contrast was not given, which limits the evaluation of the bowel.  Radiation dose reduction techniques were used for the scan per the ALARA (As Low As Reasonably Achievable) protocol. FINDINGS: There is mild calcified and noncalcified plaque along the visualized descending thoracic aorta without aneurysm or hemodynamically significant. There is calcified and irregular noncalcified plaque of the abdominal aorta with tiny ulcer-like projections. No abdominal aortic aneurysm, dissection, or intramural hematoma. Origins of the celiac and superior mesenteric arteries are patent. Single bilateral renal arteries are patent as well. The in inferior mesenteric artery is stenosed at its origin but opacifies distally. Right: There is coarse calcified plaque as well as noncalcified plaque along the the right common iliac artery with a subcentimeter moderate to severe stenosis in its mid aspect. There is high-grade stenosis at the origin of the right internal iliac artery, which opacifies distally. The right external iliac artery occludes just distal to its origin. There is reconstitution to the right common femoral artery from collateral branches as well as from the recent right axillary-bifemoral bypass. This bypass appears patent throughout its visualized course in the subcutaneous tissues of the chest wall and abdomen on the right. There is associated subcutaneous gas, which is suspected to be post operative. A tiny filling defect in the distal right common  femoral artery (image 294, series 7) may be related to nonocclusive thrombus  or an intimal flap. The right deep femoral artery is patent. The right superficial femoral artery is small in caliber and has multifocal mild to moderate stenoses as well as a 7 mm segment of high-grade stenosis versus occlusion approximately 7 cm distal to its origin. The popliteal artery is small in caliber but patent. There is infrapopliteal venous contamination with the anterior and posterior tibial arteries opacifying into the foot and a small caliber peroneal artery opacifying to the level of the ankle. Left: Coarse calcifications along the left common iliac artery cause mild narrowing. The left internal iliac artery is calcified but patent. The left external iliac artery is occluded, and there is reconstitution to the distal left common femoral artery via collaterals as well as a patent axillary-bifemoral bypass. There is an intraluminal defect in the distal left common iliac artery (image 291), which is concerning for acute thrombus. The left deep femoral artery is stenosed at its origin but demonstrates distal opacification. The left superficial femoral artery has minimal multifocal stenoses. There are foci of gas scattered along its course. The left popliteal artery is patent. There is infrapopliteal venous contamination with apparent three-vessel runoff into the distal foot. Visualized lung bases appear clear. Included portions of the heart are unremarkable. There is a 1.3 cm gallstone and mild gallbladder distention. Calcified granulomas are present within a nonenlarged spleen. A subcentimeter low-attenuation lesion in the left hepatic lobe statistically represents a cyst. There is mild nonspecific prominence of the pancreatic duct without obvious obstructive etiology. Renal cortical enhancement is symmetric, and there is no hydronephrosis. Subcentimeter too small to characterize low-attenuation lesions within the kidneys bilaterally statistically represents cysts. A large colonic stool burden is  present. No evidence of bowel obstruction. There is mild diffuse bladder wall thickening. Mild prostatomegaly. No suspicious adenopathy. The bones appear diffusely demineralized. No acute bony findings. IMPRESSION: 1. Bilateral common and external iliac artery occlusions with postoperative changes related to recent right axillary-bifemoral bypass. The bypass appears patent throughout its visualized course. There are tiny filling defects within the distal common femoral arteries bilaterally, which may represent nonocclusive thrombus. The defect on the right has a more linear configuration and could represent an intimal flap. 2. Subcentimeter high-grade stenosis versus occlusion of the right superficial femoral artery approximately 7 cm distal to its origin. Despite infrapopliteal venous contamination, the anterior and posterior tibial arteries appear to opacify into the foot, and the peroneal artery opacifies to the ankle. 3. There is infrapopliteal venous contamination on the left as well with likely normal three-vessel distal runoff. 4. Aortobiiliac atherosclerotic changes as detailed above with scattered ulcer-like lesions along the abdominal aorta but no aneurysm. Atherosclerotic changes of the mid right common iliac artery cause moderate to severe narrowing. 5. Cholelithiasis with nonspecific gallbladder distention. If there is clinical concern for acute cholecystitis, this could be further evaluated with a right upper quadrant ultrasound. 6. Nonspecific prominence of the pancreatic duct without obvious obstructive pathology. 7. Diffuse thickening of the urinary bladder wall, which could be related to chronic outlet obstruction given the prostatomegaly. Findings could also represent cystitis. 8. Additional findings as noted above. Dictated by: Regine Hopkins M.D. Images and Report reviewed and interpreted by: Regine Hopkins M.D. <PS><Electronically signed by: Regine Hopkins M.D.> 03/22/2023 1310 D: 03/22/2023  1241 T: 2023 1241    Ankle Brachial Index    Result Date: 3/22/2023  Narrative: RE[facility]ING YOUR TEST RESULTS IN [facility] IS NOT A SUBSTITUTE FOR DISCUSSING THOSE RESULTS WITH YOUR HEALTH CARE PROVIDER. PLEASE CONTACT YOUR PROVIDER [facility]IA [facility] TO DISCUSS ANY QUESTIONS OR CONCERNS YOU MAY HAVE REGARDING THESE TEST RESULTS.  RADIOLOGY REPORT FACILITY: [facility] AGE/GENDER:  AGE: 58 Y            GENDER: M PATIENT NAME/: SLIME RICE H    : 1965 UNIT NUMBER: [id number] ACCESSION NUMBER: [id number] ACCOUNT: PROCEDURE PERFORMED: NVL ANKLE BRACHIAL INDEX Conclusions: Right lower extremity demonstrates severe arterial insufficiency at rest with ankle-brachial index of 0.5 and digit pressure of 28 mmHg.Digit pressure is inadequate for healing. Left lower extremity demonstrates moderate arterial insufficiency at rest with ankle-brachial index of  0.81and digit pressure of 41 mmHg.Digit pressure is adequate for healing. No prior examination for comparison. THIS DOCUMENT HAS BEEN ELECTRONICALLY SIGNED BY: Hayden Martinez MD    Doppler Arterial Multi Level Lower Extremity - Bilateral CAR    Result Date: 3/13/2023  Narrative: •  Right Conclusion: The right MARCELINA is critically reduced. Waveforms are consistent with femoral disease, popliteal disease and tib-peroneal disease. Severe digital ischemia. •  Left Conclusion: The left MARCELINA is severely reduced. Waveforms are consistent with femoral disease. Severe digital ischemia. •  Vascular surgical evaluation would be appropriate regarding these findings       ASSESSMENT/PLAN     Diagnoses and all orders for this visit:    1. Peripheral vascular disease (Primary)    2. Gangrene      Patient really vascular lysed recently at [facility].  Betadine daily to first toe.  Discussed risk of amputation to great toe due to gangrene, and peripheral vascular disease.  Return to clinic in 2 to 3-week.  Comprehensive lower extremity examination and evaluation was  performed.    Discussed findings and treatment plan including risks, benefits, and treatment options with patient in detail. Patient agreed with treatment plan.    Medications and allergies reviewed.  Reviewed available lab values along with other pertinent labs.  These were discussed with the patient.    An After Visit Summary was printed and given to the patient at discharge, including (if requested) any available informative/educational handouts regarding diagnosis, treatment, or medications. All questions were answered to patient/family satisfaction. Should symptoms fail to improve or worsen they agree to call or return to clinic or to go to the Emergency Department. Discussed the importance of following up with any needed screening tests/labs/specialist appointments and any requested follow-up recommended by me today. Importance of maintaining follow-up discussed and patient accepts that missed appointments can delay diagnosis and potentially lead to worsening of conditions.    Return in about 3 weeks (around 5/1/2023)., or sooner if acute issues arise.    This document has been electronically signed by Sebastián Hernandez DPM on April 10, 2023 08:51 EDT

## 2023-05-17 ENCOUNTER — TRANSCRIBE ORDERS (OUTPATIENT)
Dept: ADMINISTRATIVE | Facility: HOSPITAL | Age: 58
End: 2023-05-17
Payer: MEDICAID

## 2023-05-17 DIAGNOSIS — I73.9 PVD (PERIPHERAL VASCULAR DISEASE): Primary | ICD-10-CM
